# Patient Record
Sex: MALE | Race: WHITE | NOT HISPANIC OR LATINO
[De-identification: names, ages, dates, MRNs, and addresses within clinical notes are randomized per-mention and may not be internally consistent; named-entity substitution may affect disease eponyms.]

---

## 2019-11-20 ENCOUNTER — APPOINTMENT (OUTPATIENT)
Dept: ORTHOPEDIC SURGERY | Facility: CLINIC | Age: 54
End: 2019-11-20
Payer: COMMERCIAL

## 2019-11-20 VITALS
DIASTOLIC BLOOD PRESSURE: 85 MMHG | BODY MASS INDEX: 32.21 KG/M2 | WEIGHT: 225 LBS | HEART RATE: 62 BPM | SYSTOLIC BLOOD PRESSURE: 129 MMHG | HEIGHT: 70 IN

## 2019-11-20 PROCEDURE — 99204 OFFICE O/P NEW MOD 45 MIN: CPT | Mod: 25

## 2019-11-20 PROCEDURE — 20610 DRAIN/INJ JOINT/BURSA W/O US: CPT | Mod: LT

## 2019-11-20 PROCEDURE — 73030 X-RAY EXAM OF SHOULDER: CPT | Mod: LT

## 2019-11-20 NOTE — ASSESSMENT
[FreeTextEntry1] : Patient with left shoulder pain consistent with biceps tendinitis and rotator cuff tendinitis. Patient this condition was discussed with the patient and he verbalized understanding. Recommend a cortisone injection to the left shoulder the patient tolerated well. I will send him a prescription for meloxicam for an anti-inflammatory and recommended a course of physical therapy. Should his symptoms not improve he will return to the office in 6-8 weeks for repeat evaluation.

## 2019-11-20 NOTE — PROCEDURE
[FreeTextEntry1] : Injection: Shoulder Left\par \par There was a discussion with the patient regarding this procedure and all questions/concerns were answered.  All of the risks, benefits and alternatives were discussed at length.  These include but are not limited to bleeding, infection, and allergic reaction.  Alcohol was used to clean, sterilize and prep the skin at the injection site on the posterolateral aspect of the shoulder.  Ethyl chloride spray was used as a topical anesthetic.  A 22G needle was used to inject 4cc of 1% lidocaine and 1cc of 40mg/mL methylprednisolone into the shoulder.  A sterile bandage was applied to the site of the injection.  The patient tolerated the procedure well and there were no complications.\par \par

## 2019-11-20 NOTE — PHYSICAL EXAM
[Normal Touch] : sensation intact for  touch [Normal] : no peripheral adenopathy appreciated [de-identified] : Left Shoulder Exam\par \par Inspection: No malalignment, No defects\par Skin: No masses, No lesions\par Neck: Negative Spurlings, full ROM without pain\par ROM: Full range of motion of the left shoulder with forward flexion, abduction, internal and external rotation\par Painful arc ROM: Overhead\par Tenderness: No bicipital tenderness, no tenderness to the greater tuberosity/RTC insertion, no anterior shoulder/lesser tuberosity tenderness\par Strength: 5/5 ER, 5/5 IR in adduction, 5/5 supraspinatus testing\par AC Joint: No ttp, no pain with cross arm testing\par Biceps: Speed positive\par Impingement test: Negative [place]\par Stability: Negative apprehension, negative anterior/posterior load and shift\par Vasc: 2+ radial pulse\par Neuro: AIN, PIN, Ulnar nerve in tact to motor\par Sensation: In tact to light touch throughout\par \par  [de-identified] : LURDESR [de-identified] : 3 x-ray views of the left shoulder were obtained. There are no fractures or dislocations noted.

## 2019-11-20 NOTE — HISTORY OF PRESENT ILLNESS
[FreeTextEntry1] : 11/20/2019: The patient is a 53-year-old right-hand-dominant male who presents to the office with sharp left shoulder pain. The pain isn't present since September and he states that it began after a fishing trip where he overused the left arm while reeling. The pain is located deep in the shoulder and sometimes radiates down the upper part of his humerus on the lateral side. He also has anterior shoulder pain that is sharp in nature. He has not tried any over-the-counter medications, heat, or ice. He denies any upper extremity paresthesias.

## 2020-08-20 ENCOUNTER — LABORATORY RESULT (OUTPATIENT)
Age: 55
End: 2020-08-20

## 2020-08-21 ENCOUNTER — APPOINTMENT (OUTPATIENT)
Dept: FAMILY MEDICINE | Facility: CLINIC | Age: 55
End: 2020-08-21
Payer: COMMERCIAL

## 2020-08-21 VITALS
WEIGHT: 226 LBS | TEMPERATURE: 98 F | RESPIRATION RATE: 16 BRPM | OXYGEN SATURATION: 97 % | SYSTOLIC BLOOD PRESSURE: 160 MMHG | DIASTOLIC BLOOD PRESSURE: 90 MMHG | HEART RATE: 66 BPM | BODY MASS INDEX: 32.35 KG/M2 | HEIGHT: 70 IN

## 2020-08-21 DIAGNOSIS — Z02.82 ENCOUNTER FOR ADOPTION SERVICES: ICD-10-CM

## 2020-08-21 DIAGNOSIS — M75.82 OTHER SHOULDER LESIONS, LEFT SHOULDER: ICD-10-CM

## 2020-08-21 DIAGNOSIS — M75.22 BICIPITAL TENDINITIS, LEFT SHOULDER: ICD-10-CM

## 2020-08-21 PROCEDURE — 36415 COLL VENOUS BLD VENIPUNCTURE: CPT

## 2020-08-21 PROCEDURE — 99214 OFFICE O/P EST MOD 30 MIN: CPT | Mod: 25

## 2020-08-21 PROCEDURE — 99386 PREV VISIT NEW AGE 40-64: CPT | Mod: 25

## 2020-08-21 RX ORDER — MELOXICAM 15 MG/1
15 TABLET ORAL
Qty: 30 | Refills: 2 | Status: DISCONTINUED | COMMUNITY
Start: 2019-11-20 | End: 2020-08-21

## 2020-08-22 PROBLEM — M75.82 TENDINITIS OF LEFT ROTATOR CUFF: Status: RESOLVED | Noted: 2019-11-20 | Resolved: 2020-08-22

## 2020-08-22 PROBLEM — M75.22 BICEPS TENDINITIS OF LEFT UPPER EXTREMITY: Status: RESOLVED | Noted: 2019-11-20 | Resolved: 2020-08-22

## 2020-08-22 RX ORDER — ASPIRIN 81 MG
81 TABLET, DELAYED RELEASE (ENTERIC COATED) ORAL
Refills: 0 | Status: ACTIVE | COMMUNITY

## 2020-08-22 NOTE — PHYSICAL EXAM
[Well-Appearing] : well-appearing [Normal Sclera/Conjunctiva] : normal sclera/conjunctiva [No JVD] : no jugular venous distention [Regular Rhythm] : with a regular rhythm [No Respiratory Distress] : no respiratory distress  [Clear to Auscultation] : lungs were clear to auscultation bilaterally [Normal S1, S2] : normal S1 and S2 [No Joint Swelling] : no joint swelling [No Carotid Bruits] : no carotid bruits [Grossly Normal Strength/Tone] : grossly normal strength/tone [No Rash] : no rash [No Focal Deficits] : no focal deficits [Normal Gait] : normal gait [Speech Grossly Normal] : speech grossly normal [Alert and Oriented x3] : oriented to person, place, and time [de-identified] : ADITI [de-identified] : a bit anxious  engaging  [de-identified] : tanned

## 2020-08-22 NOTE — ASSESSMENT
[FreeTextEntry1] : New Patient exam for 54  year old WM with PMH as stated in HPI / active list. \par \par Noted to be hypertensive likely due to a combination of recent weight gain , alcohol use and lack of exercise and diet.\par Advised RASHEL and decrease in alcohol an begin daily walk.\par ASLO prescribed  valsartan/hctz  and advised FU in one week.\par \par Management : \par Will need cardiology and referral provided.\par Will need colonoscopy. \par \par See HPI and Plan\par \par Labs in office today.   Will advise. \par \par Best wishes offered !\par

## 2020-08-22 NOTE — REVIEW OF SYSTEMS
[Anxiety] : anxiety [Negative] : Eyes [Fever] : no fever [Chills] : no chills [Night Sweats] : no night sweats [Chest Pain] : no chest pain [Palpitations] : no palpitations [Lower Ext Edema] : no lower extremity edema [Abdominal Pain] : no abdominal pain [Shortness Of Breath] : no shortness of breath [Melena] : no melena [Heartburn] : no heartburn [Dizziness] : no dizziness [Headache] : no headache

## 2020-08-22 NOTE — HISTORY OF PRESENT ILLNESS
[FreeTextEntry1] : refill of foam SEND TO [facility] [place] \par and aptiom SEND TO MAIL AWAY\par  [de-identified] : Mr. ANTONIO LOUIS presents today to establish care being referred to me by his research search on internet. \par He is an affable 54 year old male with PMH significant for Seizure DO which he states occur only while sleeping and he is somewhat awake when occurring.  Multiple consult over the years (started at age 19) and multiple sleep studies.  Ultimately managed by Dr. JENNA Melendez at [facility].  \par Endorses his disappointment with weight gain, ETOH and little exercise during COVID.\par "Need to get back on track"\par \par PSH significant for  cardiac STENT  5 years ago\par \par Denies any recent ER visits/hospitalizations/ MVA's or MSK injuries. \par \par Providers:  Neurology  Dr Melendez\par                   Cardiology \par \par  HM:  Colonoscopy   NEVER\par \par Social:   ;  Ellie;  no children  \par             Works as  \par             Lived in [place] in recent decade  however NOW LIVES [place]/ [place] \par             Enjoys Boating

## 2020-08-27 ENCOUNTER — APPOINTMENT (OUTPATIENT)
Dept: CARDIOLOGY | Facility: CLINIC | Age: 55
End: 2020-08-27
Payer: COMMERCIAL

## 2020-08-27 ENCOUNTER — NON-APPOINTMENT (OUTPATIENT)
Age: 55
End: 2020-08-27

## 2020-08-27 VITALS
DIASTOLIC BLOOD PRESSURE: 80 MMHG | WEIGHT: 222 LBS | SYSTOLIC BLOOD PRESSURE: 112 MMHG | OXYGEN SATURATION: 96 % | BODY MASS INDEX: 31.85 KG/M2 | TEMPERATURE: 97.1 F | HEART RATE: 61 BPM

## 2020-08-27 PROCEDURE — 99205 OFFICE O/P NEW HI 60 MIN: CPT

## 2020-08-27 PROCEDURE — 93000 ELECTROCARDIOGRAM COMPLETE: CPT

## 2020-08-27 NOTE — ASSESSMENT
[FreeTextEntry1] : CAD -status post stent placement , now he has return of same symptoms; I recommended echocardiography for LV size, wall motion, LVEF; exercise myocardial perfusion scan to suggest ischemia.  Red flag symptom has been discussed with him.  Continue current medications\par \par Dyslipidemia -low cholesterol diet, continue medications, add Vascepa to his regimen; lipid panel in 6 weeks\par \par Hypertension -low-salt diet, continue medications\par \par Seizure disorder -continue medication follow-up with neurologist.\par \par Aggressive risk factor modification has been discussed with him at great length include regular walking, compliance to medication, low-cholesterol diet.  He will be reevaluated me after cardiac testing.

## 2020-08-27 NOTE — PHYSICAL EXAM
[General Appearance - Well Developed] : well developed [General Appearance - Well Nourished] : well nourished [Normal Appearance] : normal appearance [Well Groomed] : well groomed [No Deformities] : no deformities [General Appearance - In No Acute Distress] : no acute distress [Eyelids - No Xanthelasma] : the eyelids demonstrated no xanthelasmas [Normal Oral Mucosa] : normal oral mucosa [Normal Conjunctiva] : the conjunctiva exhibited no abnormalities [No Oral Cyanosis] : no oral cyanosis [No Oral Pallor] : no oral pallor [No Jugular Venous Valdovinos A Waves] : no jugular venous valdovinos A waves [Normal Jugular Venous V Waves Present] : normal jugular venous V waves present [Normal Jugular Venous A Waves Present] : normal jugular venous A waves present [Heart Sounds] : normal S1 and S2 [Heart Rate And Rhythm] : heart rate and rhythm were normal [Murmurs] : no murmurs present [Respiration, Rhythm And Depth] : normal respiratory rhythm and effort [Exaggerated Use Of Accessory Muscles For Inspiration] : no accessory muscle use [Auscultation Breath Sounds / Voice Sounds] : lungs were clear to auscultation bilaterally [Abdomen Soft] : soft [Abdomen Tenderness] : non-tender [Abdomen Mass (___ Cm)] : no abdominal mass palpated [Abnormal Walk] : normal gait [Gait - Sufficient For Exercise Testing] : the gait was sufficient for exercise testing [Petechial Hemorrhages (___cm)] : no petechial hemorrhages [Nail Clubbing] : no clubbing of the fingernails [Cyanosis, Localized] : no localized cyanosis [Skin Color & Pigmentation] : normal skin color and pigmentation [No Venous Stasis] : no venous stasis [] : no rash [Skin Lesions] : no skin lesions [No Xanthoma] : no  xanthoma was observed [Oriented To Time, Place, And Person] : oriented to person, place, and time [No Skin Ulcers] : no skin ulcer [Affect] : the affect was normal [Mood] : the mood was normal [No Anxiety] : not feeling anxious

## 2020-09-01 ENCOUNTER — APPOINTMENT (OUTPATIENT)
Dept: FAMILY MEDICINE | Facility: CLINIC | Age: 55
End: 2020-09-01
Payer: COMMERCIAL

## 2020-09-01 VITALS
WEIGHT: 225 LBS | HEIGHT: 70 IN | OXYGEN SATURATION: 98 % | SYSTOLIC BLOOD PRESSURE: 130 MMHG | TEMPERATURE: 98.1 F | RESPIRATION RATE: 16 BRPM | BODY MASS INDEX: 32.21 KG/M2 | DIASTOLIC BLOOD PRESSURE: 80 MMHG | HEART RATE: 68 BPM

## 2020-09-01 VITALS — DIASTOLIC BLOOD PRESSURE: 78 MMHG | SYSTOLIC BLOOD PRESSURE: 122 MMHG

## 2020-09-01 PROCEDURE — 99214 OFFICE O/P EST MOD 30 MIN: CPT

## 2020-09-01 NOTE — PHYSICAL EXAM
[No Acute Distress] : no acute distress [Normal Sclera/Conjunctiva] : normal sclera/conjunctiva [No JVD] : no jugular venous distention [No Respiratory Distress] : no respiratory distress  [No Accessory Muscle Use] : no accessory muscle use [Regular Rhythm] : with a regular rhythm [Normal S1, S2] : normal S1 and S2 [Normal] : normal gait, coordination grossly intact, no focal deficits and deep tendon reflexes were 2+ and symmetric [Alert and Oriented x3] : oriented to person, place, and time [Normal Insight/Judgement] : insight and judgment were intact [de-identified] : a bit anxious  engaging

## 2020-09-01 NOTE — HISTORY OF PRESENT ILLNESS
[FreeTextEntry1] : would like script for colonoscopy \par discuss lab results \par [facility]  [de-identified] : Last seen to establish care Aug.21 and encounter reviewed.\par HTN and started on diltiazem.\par Seen in consultation with  with Cardiology and reviewed;  anticipating nuclear stress test and ECHO mid October.\par Vascepa  added  for elevated TG's \par has walked "40 miles" in the last week.\par \par Feels good.

## 2020-09-01 NOTE — REVIEW OF SYSTEMS
[Fatigue] : fatigue [Negative] : Psychiatric [Fever] : no fever [Night Sweats] : no night sweats [FreeTextEntry2] :                                                                                     Chronic due to Keppra

## 2020-09-01 NOTE — ASSESSMENT
[FreeTextEntry1] : HTN  now at goal  continue current management and continue walking and dietary discretion! \par \par FU i 6 weeks and will repeat FASTING LIPID PANEL

## 2020-09-14 ENCOUNTER — RX CHANGE (OUTPATIENT)
Age: 55
End: 2020-09-14

## 2020-09-15 RX ORDER — CLOBETASOL PROPIONATE 0.5 MG/G
0.05 AEROSOL, FOAM TOPICAL TWICE DAILY
Qty: 1 | Refills: 3 | Status: DISCONTINUED | COMMUNITY
Start: 1900-01-01 | End: 2020-09-15

## 2020-09-29 RX ORDER — LEVETIRACETAM 500 MG/1
500 TABLET, FILM COATED ORAL TWICE DAILY
Qty: 80 | Refills: 0 | Status: DISCONTINUED | COMMUNITY
Start: 1900-01-01 | End: 2020-09-29

## 2020-10-14 ENCOUNTER — LABORATORY RESULT (OUTPATIENT)
Age: 55
End: 2020-10-14

## 2020-10-14 ENCOUNTER — APPOINTMENT (OUTPATIENT)
Dept: FAMILY MEDICINE | Facility: CLINIC | Age: 55
End: 2020-10-14
Payer: COMMERCIAL

## 2020-10-14 VITALS — TEMPERATURE: 98.1 F

## 2020-10-14 PROCEDURE — 36415 COLL VENOUS BLD VENIPUNCTURE: CPT

## 2020-10-15 ENCOUNTER — APPOINTMENT (OUTPATIENT)
Dept: CARDIOLOGY | Facility: CLINIC | Age: 55
End: 2020-10-15
Payer: COMMERCIAL

## 2020-10-15 PROCEDURE — 93015 CV STRESS TEST SUPVJ I&R: CPT

## 2020-10-15 PROCEDURE — A9502: CPT

## 2020-10-15 PROCEDURE — 78452 HT MUSCLE IMAGE SPECT MULT: CPT

## 2020-10-15 PROCEDURE — 93306 TTE W/DOPPLER COMPLETE: CPT

## 2020-10-19 ENCOUNTER — RX RENEWAL (OUTPATIENT)
Age: 55
End: 2020-10-19

## 2020-10-20 ENCOUNTER — APPOINTMENT (OUTPATIENT)
Dept: CARDIOLOGY | Facility: CLINIC | Age: 55
End: 2020-10-20
Payer: COMMERCIAL

## 2020-10-20 VITALS
DIASTOLIC BLOOD PRESSURE: 82 MMHG | OXYGEN SATURATION: 97 % | BODY MASS INDEX: 32.14 KG/M2 | WEIGHT: 224 LBS | HEART RATE: 61 BPM | TEMPERATURE: 97.1 F | SYSTOLIC BLOOD PRESSURE: 128 MMHG

## 2020-10-20 DIAGNOSIS — Z12.11 ENCOUNTER FOR SCREENING FOR MALIGNANT NEOPLASM OF COLON: ICD-10-CM

## 2020-10-20 PROCEDURE — 99214 OFFICE O/P EST MOD 30 MIN: CPT

## 2020-10-20 RX ORDER — LEVETIRACETAM 500 MG/1
500 TABLET, FILM COATED, EXTENDED RELEASE ORAL
Qty: 180 | Refills: 1 | Status: DISCONTINUED | COMMUNITY
Start: 2020-09-17 | End: 2020-10-20

## 2020-10-20 RX ORDER — LEVETIRACETAM 750 MG/1
750 TABLET, EXTENDED RELEASE ORAL
Qty: 180 | Refills: 2 | Status: DISCONTINUED | COMMUNITY
Start: 2020-10-20 | End: 2020-10-20

## 2020-10-20 RX ORDER — LEVETIRACETAM 750 MG/1
750 TABLET, FILM COATED ORAL TWICE DAILY
Qty: 180 | Refills: 1 | Status: DISCONTINUED | COMMUNITY
Start: 2020-10-19 | End: 2020-10-20

## 2020-10-21 RX ORDER — LEVETIRACETAM 500 MG/1
500 TABLET, FILM COATED, EXTENDED RELEASE ORAL
Qty: 180 | Refills: 1 | Status: DISCONTINUED | COMMUNITY
Start: 2020-10-20 | End: 2020-10-21

## 2020-12-27 ENCOUNTER — APPOINTMENT (OUTPATIENT)
Dept: DISASTER EMERGENCY | Facility: CLINIC | Age: 55
End: 2020-12-27

## 2020-12-28 LAB — SARS-COV-2 N GENE NPH QL NAA+PROBE: NOT DETECTED

## 2020-12-30 ENCOUNTER — OUTPATIENT (OUTPATIENT)
Dept: OUTPATIENT SERVICES | Facility: HOSPITAL | Age: 55
LOS: 1 days | End: 2020-12-30

## 2021-04-07 ENCOUNTER — NON-APPOINTMENT (OUTPATIENT)
Age: 56
End: 2021-04-07

## 2021-04-12 ENCOUNTER — LABORATORY RESULT (OUTPATIENT)
Age: 56
End: 2021-04-12

## 2021-04-13 ENCOUNTER — APPOINTMENT (OUTPATIENT)
Dept: FAMILY MEDICINE | Facility: CLINIC | Age: 56
End: 2021-04-13
Payer: COMMERCIAL

## 2021-04-13 VITALS
WEIGHT: 209 LBS | OXYGEN SATURATION: 98 % | HEART RATE: 67 BPM | SYSTOLIC BLOOD PRESSURE: 120 MMHG | BODY MASS INDEX: 29.92 KG/M2 | TEMPERATURE: 98 F | DIASTOLIC BLOOD PRESSURE: 78 MMHG | RESPIRATION RATE: 16 BRPM | HEIGHT: 70 IN

## 2021-04-13 PROCEDURE — 99214 OFFICE O/P EST MOD 30 MIN: CPT | Mod: 25

## 2021-04-13 PROCEDURE — 36415 COLL VENOUS BLD VENIPUNCTURE: CPT

## 2021-04-13 PROCEDURE — 99072 ADDL SUPL MATRL&STAF TM PHE: CPT

## 2021-04-13 PROCEDURE — 96372 THER/PROPH/DIAG INJ SC/IM: CPT

## 2021-04-13 RX ORDER — TAMSULOSIN HYDROCHLORIDE 0.4 MG/1
0.4 CAPSULE ORAL
Qty: 90 | Refills: 1 | Status: DISCONTINUED | COMMUNITY
Start: 2020-10-20 | End: 2021-04-13

## 2021-04-13 RX ADMIN — METHYLPREDNISOLONE SODIUM SUCCINATE MG: 40 INJECTION, POWDER, FOR SOLUTION INTRAMUSCULAR; INTRAVENOUS at 00:00

## 2021-04-14 RX ORDER — METHYLPREDNISOLONE 40 MG/ML
40 INJECTION, POWDER, LYOPHILIZED, FOR SOLUTION INTRAMUSCULAR; INTRAVENOUS
Qty: 1 | Refills: 0 | Status: COMPLETED | OUTPATIENT
Start: 2021-04-13

## 2021-04-14 NOTE — HISTORY OF PRESENT ILLNESS
[FreeTextEntry1] : 6M follow up \par needs refills on valsartan-hctz, levetiracetam and aptiom\par miri sheehan  [de-identified] : Last seen in office on Sept. 2020 encounter reviewed.\par Patient presents to the office today for 6 month follow up - medication refill. Patient is being seen for HTN and seizure disorder FU. Patient was prediabetic last time.  Patient has received the Pfizer COVID-19 vaccination. Patient has actively lost about 15 lbs. Patient walks 20-25 miles a week and has modified his diet. Patient is complaining of back spasms for the past two weeks. Last week the pain had worsened, and shot down the arm. The patient was not able to move his arm secondary to pain. Nelson has been taking ibuprofen and Tylenol to relieve the pain. Patient confirms laying down helps with the shooting pain. Endorses he was gardening and also lifting heavy boxes in recent weeks.\par DID have massage which helped "a bit" \par Patient has went to ortho regarding this in the past and was given a steroid shot. 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 Patient went to [facility] for colonoscopy - no abnormal findings - Jan 2021. Denies any recent ER visits/hospitalizations/ MVA's or MSK injuries. \par \par In review Sept 2020:\par HTN and started on diltiazem.\par Seen in consultation with  with Cardiology and reviewed;  anticipating nuclear stress test and ECHO mid October.\par Vascepa  added  for elevated TG's has walked "40 miles" in the last week.

## 2021-04-14 NOTE — ADDENDUM
[FreeTextEntry1] : I, Lubna Hillman acting as a scribe for Dr. Claudette Ahn MD on 04/13/2021 at 3:16 PM. \par I, May Love acting as a scribe for Dr. Claudette Ahn MD on 04/13/2021 at 3:23 PM.

## 2021-04-14 NOTE — END OF VISIT
[FreeTextEntry3] : Medical record entries made by the scribe today, were at my direction and personally dictated to them by me, Dr. Claudette Ahn on 04/13/2021. I have reviewed the chart and agree that the record accurately reflects my personal performance of the history, physical exam, assessment and plan.

## 2021-04-14 NOTE — PLAN
[FreeTextEntry1] : FUV  for 55 year old M with PMH as stated in HPI / active list. \par \par Management : \par \par Medication refill provided and no change in dosage or frequency. \par \par See HPI and Plan\par \par Labs in office today.\par \par Will prescribe muscle relaxer for the patient as needed. Advised patient to only take medication at bedtime, and to avoid operating machinery after taking medication.  \par Steroid injection given in office today.  \par \par Best wishes offered !

## 2021-04-14 NOTE — PHYSICAL EXAM
[de-identified] : calm and engaging  [de-identified] : tenderness left shoulder blade region with palpable spasming

## 2021-04-27 ENCOUNTER — APPOINTMENT (OUTPATIENT)
Dept: CARDIOLOGY | Facility: CLINIC | Age: 56
End: 2021-04-27
Payer: COMMERCIAL

## 2021-04-27 VITALS
HEART RATE: 61 BPM | BODY MASS INDEX: 29.7 KG/M2 | WEIGHT: 207 LBS | SYSTOLIC BLOOD PRESSURE: 130 MMHG | DIASTOLIC BLOOD PRESSURE: 88 MMHG | TEMPERATURE: 98 F | OXYGEN SATURATION: 99 %

## 2021-04-27 DIAGNOSIS — M62.838 OTHER MUSCLE SPASM: ICD-10-CM

## 2021-04-27 DIAGNOSIS — Z01.818 ENCOUNTER FOR OTHER PREPROCEDURAL EXAMINATION: ICD-10-CM

## 2021-04-27 DIAGNOSIS — Z87.39 PERSONAL HISTORY OF OTHER DISEASES OF THE MUSCULOSKELETAL SYSTEM AND CONNECTIVE TISSUE: ICD-10-CM

## 2021-04-27 PROCEDURE — 99214 OFFICE O/P EST MOD 30 MIN: CPT

## 2021-04-27 PROCEDURE — 99072 ADDL SUPL MATRL&STAF TM PHE: CPT

## 2021-05-04 ENCOUNTER — MED ADMIN CHARGE (OUTPATIENT)
Age: 56
End: 2021-05-04

## 2021-05-04 ENCOUNTER — APPOINTMENT (OUTPATIENT)
Dept: FAMILY MEDICINE | Facility: CLINIC | Age: 56
End: 2021-05-04
Payer: COMMERCIAL

## 2021-05-04 VITALS
OXYGEN SATURATION: 98 % | SYSTOLIC BLOOD PRESSURE: 150 MMHG | WEIGHT: 208 LBS | BODY MASS INDEX: 29.78 KG/M2 | HEIGHT: 70 IN | HEART RATE: 71 BPM | TEMPERATURE: 97.2 F | DIASTOLIC BLOOD PRESSURE: 90 MMHG | RESPIRATION RATE: 16 BRPM

## 2021-05-04 PROCEDURE — 20552 NJX 1/MLT TRIGGER POINT 1/2: CPT

## 2021-05-04 PROCEDURE — 99214 OFFICE O/P EST MOD 30 MIN: CPT | Mod: 25

## 2021-05-04 PROCEDURE — 99072 ADDL SUPL MATRL&STAF TM PHE: CPT

## 2021-05-04 RX ADMIN — TRIAMCINOLONE ACETONIDE 0 MG/ML: 40 INJECTION, SUSPENSION INTRA-ARTICULAR; INTRAMUSCULAR at 00:00

## 2021-05-04 NOTE — REVIEW OF SYSTEMS
[Joint Pain] : joint pain [Muscle Pain] : muscle pain [Negative] : Gastrointestinal [FreeTextEntry9] : (+) left shoulder pain

## 2021-05-04 NOTE — PHYSICAL EXAM
[No Acute Distress] : no acute distress [Normal Sclera/Conjunctiva] : normal sclera/conjunctiva [No Respiratory Distress] : no respiratory distress  [No Accessory Muscle Use] : no accessory muscle use [Regular Rhythm] : with a regular rhythm [Normal S1, S2] : normal S1 and S2 [de-identified] : engaging   [de-identified] : OMM  MASK [de-identified] : point tenderness upper mid back  no spasming appreciated.

## 2021-05-04 NOTE — END OF VISIT
[FreeTextEntry3] : Medical record entries made by the scribe today, were at my direction and personally dictated to them by me, Dr. Claudette Ahn on 05/04/2021. I have reviewed the chart and agree that the record accurately reflects my personal performance of the history, physical exam, assessment and plan.

## 2021-05-04 NOTE — ADDENDUM
[FreeTextEntry1] : I, Lubna Hillman acting as a scribe for Dr. Claudette Ahn MD on 05/04/2021 at 11:24 AM.

## 2021-05-04 NOTE — PLAN
[FreeTextEntry1] : Acute visit for 55 year old M with PMH as stated in HPI / active list. \par \par Management : \par \par See HPI and Plan\par \par Administered a Lidocaine / steroid  injection to help alleviate the left  trapezius. \par antisepsis with alcohol/betadine to skin\par under aseptic technique 1cc 1% lido + 1cc kenalog 40mg injected into left trapezius medial scapular boarder\par Procedure well tolerated w/o complication, DSD applied\par Performed by ROWENA SLATER\par \par ADVISED to rest UE and consider a sling for a few days.  \par \par \par Best wishes offered !

## 2021-05-04 NOTE — HISTORY OF PRESENT ILLNESS
[FreeTextEntry8] : 55 year old M presents today c/o of left shoulder pain/ trapezius PAIN  onset 6 weeks ago. \par Patient states that he feels "tingling sensations".\par Patient has taken Prednisone and muscle relaxers which did provide some relief however temporary.\par When queried admits to returning to activities in yard. \par Patient also reports he has used a heating pad which slightly alleviated the pain. \par \par NOW endorses hx of abhishek e 6 years ago and received TRIGGER point injection with relief and resolution\par \par Last seen in office 04/13/2021 for FUV- encounter reviewed. \par [place] [facility] \par [facility]\par

## 2021-05-05 RX ORDER — TRIAMCINOLONE ACETONIDE 40 MG/ML
40 SUSPENSION INTRA-ARTERIAL; INTRAMUSCULAR
Qty: 1 | Refills: 0 | Status: COMPLETED | OUTPATIENT
Start: 2021-05-04

## 2021-07-28 ENCOUNTER — NON-APPOINTMENT (OUTPATIENT)
Age: 56
End: 2021-07-28

## 2021-11-16 ENCOUNTER — APPOINTMENT (OUTPATIENT)
Dept: FAMILY MEDICINE | Facility: CLINIC | Age: 56
End: 2021-11-16
Payer: COMMERCIAL

## 2021-11-16 ENCOUNTER — NON-APPOINTMENT (OUTPATIENT)
Age: 56
End: 2021-11-16

## 2021-11-16 VITALS
TEMPERATURE: 97.9 F | WEIGHT: 205 LBS | HEIGHT: 70 IN | HEART RATE: 64 BPM | RESPIRATION RATE: 16 BRPM | BODY MASS INDEX: 29.35 KG/M2 | OXYGEN SATURATION: 97 % | DIASTOLIC BLOOD PRESSURE: 82 MMHG | SYSTOLIC BLOOD PRESSURE: 130 MMHG

## 2021-11-16 DIAGNOSIS — Z00.00 ENCOUNTER FOR GENERAL ADULT MEDICAL EXAMINATION W/OUT ABNORMAL FINDINGS: ICD-10-CM

## 2021-11-16 DIAGNOSIS — M25.512 PAIN IN LEFT SHOULDER: ICD-10-CM

## 2021-11-16 DIAGNOSIS — S46.819A STRAIN OF OTHER MUSCLES, FASCIA AND TENDONS AT SHOULDER AND UPPER ARM LEVEL, UNSPECIFIED ARM, INITIAL ENCOUNTER: ICD-10-CM

## 2021-11-16 PROCEDURE — 36415 COLL VENOUS BLD VENIPUNCTURE: CPT

## 2021-11-16 PROCEDURE — 99214 OFFICE O/P EST MOD 30 MIN: CPT | Mod: 25

## 2021-11-16 PROCEDURE — 99396 PREV VISIT EST AGE 40-64: CPT | Mod: 25

## 2021-11-16 RX ORDER — ICOSAPENT ETHYL 1 G/1
1 CAPSULE ORAL
Qty: 360 | Refills: 1 | Status: DISCONTINUED | COMMUNITY
End: 2021-11-16

## 2021-11-17 PROBLEM — S46.819A TRAPEZIUS STRAIN: Status: RESOLVED | Noted: 2021-05-04 | Resolved: 2021-11-17

## 2021-11-17 PROBLEM — M25.512 ACUTE PAIN OF LEFT SHOULDER: Status: RESOLVED | Noted: 2021-05-04 | Resolved: 2021-11-17

## 2021-11-17 NOTE — HISTORY OF PRESENT ILLNESS
[FreeTextEntry1] : CPE \par Last seen for CPE when establishing care AUGUST 2020\par Since has followed with cardiology for ASHD, s/p stent 2017 as well as  HTN/HLD  ; echo and nuclear stress test Normal. \par With  regard to seizure DO he has not seen Neurology since MArch 2020; endorses he continues to have some nocturnal seiazures intermittently evident to him as witnesses by wife and he note fatigue next day. \par Will again request Consult note form Dr. Lester .\par Acute visit here for trapezium spasming and cervical pain; resolved after trigger injections and res.\par \par Activity: enjoys heavy duty yard work and walks 5-7 miles several days per week.\par \par Again is working part-time  for [employer] in  work.  Enjoys it.  [de-identified] : In review: August 2020\par Mr. ANTONIO LOUIS presents today to establish care being referred to me by his research search on internet. \par He is an affable 54 year old male with PMH significant for Seizure DO which he states occur only while sleeping and he is somewhat awake when occurring.  Multiple consult over the years (started at age 19) and multiple sleep studies.  Ultimately managed by Dr. JENNA Melendez at [facility].  \par Endorses his disappointment with weight gain, ETOH and little exercise during COVID.\par "Need to get back on track"\par \par PSH significant for  cardiac STENT  5 years ago\par \par Denies any recent ER visits/hospitalizations/ MVA's or MSK injuries. \par \par Providers:  Neurology  Dr Melendez\par                   Cardiology \par \par  HM:  Colonoscopy   NEVER\par \par Social:   ;  Ellie;  no children  \par             Works as  \par             Lived in [place] in recent decade  however NOW LIVES [place]/ [place] \par             Enjoys Boating

## 2021-11-17 NOTE — PHYSICAL EXAM
[Well-Appearing] : well-appearing [Normal Sclera/Conjunctiva] : normal sclera/conjunctiva [PERRL] : pupils equal round and reactive to light [Normal TMs] : both tympanic membranes were normal [No JVD] : no jugular venous distention [No Lymphadenopathy] : no lymphadenopathy [Supple] : supple [No Respiratory Distress] : no respiratory distress  [Clear to Auscultation] : lungs were clear to auscultation bilaterally [Regular Rhythm] : with a regular rhythm [Normal S1, S2] : normal S1 and S2 [No Carotid Bruits] : no carotid bruits [Soft] : abdomen soft [Non Tender] : non-tender [No Masses] : no abdominal mass palpated [Normal Supraclavicular Nodes] : no supraclavicular lymphadenopathy [Normal Posterior Cervical Nodes] : no posterior cervical lymphadenopathy [Normal Anterior Cervical Nodes] : no anterior cervical lymphadenopathy [No Spinal Tenderness] : no spinal tenderness [No Joint Swelling] : no joint swelling [Grossly Normal Strength/Tone] : grossly normal strength/tone [No Rash] : no rash [No Focal Deficits] : no focal deficits [Normal Gait] : normal gait [Speech Grossly Normal] : speech grossly normal [Alert and Oriented x3] : oriented to person, place, and time [de-identified] :   engaging  [de-identified] : ADITI [de-identified] : tanned  follows with [facility]

## 2021-11-17 NOTE — HEALTH RISK ASSESSMENT
[Very Good] : ~his/her~  mood as very good [Yes] : Yes [2 - 3 times a week (3 pts)] : 2 - 3  times a week (3 points) [No falls in past year] : Patient reported no falls in the past year [0] : 2) Feeling down, depressed, or hopeless: Not at all (0) [PHQ-2 Negative - No further assessment needed] : PHQ-2 Negative - No further assessment needed [] : No [de-identified] : DENIES  [de-identified] : cardiology  neurology  [de-identified] : walks  5-7 miles several days per week  [de-identified] : Jerzy  'sometimes too much  [[id number]] : 0 [Patient reported colonoscopy was normal] : Patient reported colonoscopy was normal [ColonoscopyDate] : 2020  [ColonoscopyComments] : FU 10 years

## 2021-11-17 NOTE — ASSESSMENT
[FreeTextEntry1] : Annual exam for 55  year old WM with PMH as stated in HPI / active list. \par \par Management : \par \par Refills provided. \par \par Again will request ON/Consult from Neurologist;  advise to concern establishing with neurology here as he endorses Dr. Lester told him he  didn't need to FU unless "a new issue". \par \par \par See HPI and Plan\par \par Labs in office today.   Will advise. \par \par Best wishes offered ! \par

## 2021-11-17 NOTE — REVIEW OF SYSTEMS
[Anxiety] : anxiety [Negative] : Integumentary [Fever] : no fever [Chills] : no chills [Night Sweats] : no night sweats [Chest Pain] : no chest pain [Palpitations] : no palpitations [Lower Ext Edema] : no lower extremity edema [Shortness Of Breath] : no shortness of breath [Abdominal Pain] : no abdominal pain [Heartburn] : no heartburn [Melena] : no melena [Headache] : no headache [Dizziness] : no dizziness

## 2021-11-17 NOTE — PHYSICAL EXAM
[Well-Appearing] : well-appearing [Normal Sclera/Conjunctiva] : normal sclera/conjunctiva [PERRL] : pupils equal round and reactive to light [Normal TMs] : both tympanic membranes were normal [No JVD] : no jugular venous distention [No Lymphadenopathy] : no lymphadenopathy [Supple] : supple [No Respiratory Distress] : no respiratory distress  [Clear to Auscultation] : lungs were clear to auscultation bilaterally [Regular Rhythm] : with a regular rhythm [Normal S1, S2] : normal S1 and S2 [No Carotid Bruits] : no carotid bruits [Soft] : abdomen soft [Non Tender] : non-tender [No Masses] : no abdominal mass palpated [Normal Supraclavicular Nodes] : no supraclavicular lymphadenopathy [Normal Posterior Cervical Nodes] : no posterior cervical lymphadenopathy [Normal Anterior Cervical Nodes] : no anterior cervical lymphadenopathy [No Spinal Tenderness] : no spinal tenderness [No Joint Swelling] : no joint swelling [Grossly Normal Strength/Tone] : grossly normal strength/tone [No Rash] : no rash [No Focal Deficits] : no focal deficits [Normal Gait] : normal gait [Speech Grossly Normal] : speech grossly normal [Alert and Oriented x3] : oriented to person, place, and time [de-identified] :   engaging  [de-identified] : ADITI [de-identified] : tanned  follows with [facility]

## 2021-11-17 NOTE — HEALTH RISK ASSESSMENT
[Very Good] : ~his/her~  mood as very good [Yes] : Yes [2 - 3 times a week (3 pts)] : 2 - 3  times a week (3 points) [No falls in past year] : Patient reported no falls in the past year [0] : 2) Feeling down, depressed, or hopeless: Not at all (0) [PHQ-2 Negative - No further assessment needed] : PHQ-2 Negative - No further assessment needed [] : No [de-identified] : DENIES  [de-identified] : cardiology  neurology  [de-identified] : walks  5-7 miles several days per week  [de-identified] : Jerzy  'sometimes too much  [[id number]] : 0 [Patient reported colonoscopy was normal] : Patient reported colonoscopy was normal [ColonoscopyDate] : 2020  [ColonoscopyComments] : FU 10 years

## 2021-11-17 NOTE — HISTORY OF PRESENT ILLNESS
[FreeTextEntry1] : CPE \par Last seen for CPE when establishing care AUGUST 2020\par Since has followed with cardiology for ASHD, s/p stent 2017 as well as  HTN/HLD  ; echo and nuclear stress test Normal. \par With  regard to seizure DO he has not seen Neurology since MArch 2020; endorses he continues to have some nocturnal seiazures intermittently evident to him as witnesses by wife and he note fatigue next day. \par Will again request Consult note form Dr. Lester .\par Acute visit here for trapezium spasming and cervical pain; resolved after trigger injections and res.\par \par Activity: enjoys heavy duty yard work and walks 5-7 miles several days per week.\par \par Again is working part-time  for [employer] in  work.  Enjoys it.  [de-identified] : In review: August 2020\par Mr. ANTONIO LOUIS presents today to establish care being referred to me by his research search on internet. \par He is an affable 54 year old male with PMH significant for Seizure DO which he states occur only while sleeping and he is somewhat awake when occurring.  Multiple consult over the years (started at age 19) and multiple sleep studies.  Ultimately managed by Dr. JENNA Melendez at [facility].  \par Endorses his disappointment with weight gain, ETOH and little exercise during COVID.\par "Need to get back on track"\par \par PSH significant for  cardiac STENT  5 years ago\par \par Denies any recent ER visits/hospitalizations/ MVA's or MSK injuries. \par \par Providers:  Neurology  Dr Melendez\par                   Cardiology \par \par  HM:  Colonoscopy   NEVER\par \par Social:   ;  Ellie;  no children  \par             Works as  \par             Lived in [place] in recent decade  however NOW LIVES [place]/ [place] \par             Enjoys Boating

## 2021-11-24 LAB
ALBUMIN SERPL ELPH-MCNC: 5.1 G/DL
ALP BLD-CCNC: 75 U/L
ALT SERPL-CCNC: 53 U/L
ANION GAP SERPL CALC-SCNC: 18 MMOL/L
AST SERPL-CCNC: 34 U/L
BASOPHILS # BLD AUTO: 0.05 K/UL
BASOPHILS NFR BLD AUTO: 1.1 %
BILIRUB SERPL-MCNC: 0.9 MG/DL
BUN SERPL-MCNC: 14 MG/DL
CALCIUM SERPL-MCNC: 10.3 MG/DL
CHLORIDE SERPL-SCNC: 97 MMOL/L
CHOLEST SERPL-MCNC: 245 MG/DL
CO2 SERPL-SCNC: 23 MMOL/L
CREAT SERPL-MCNC: 0.99 MG/DL
EOSINOPHIL # BLD AUTO: 0.08 K/UL
EOSINOPHIL NFR BLD AUTO: 1.7 %
ESTIMATED AVERAGE GLUCOSE: 123 MG/DL
FOLATE SERPL-MCNC: 13.6 NG/ML
GLUCOSE SERPL-MCNC: 129 MG/DL
HBA1C MFR BLD HPLC: 5.9 %
HCT VFR BLD CALC: 48.2 %
HDLC SERPL-MCNC: 78 MG/DL
HGB BLD-MCNC: 16.3 G/DL
IMM GRANULOCYTES NFR BLD AUTO: 0 %
LDLC SERPL CALC-MCNC: 97 MG/DL
LYMPHOCYTES # BLD AUTO: 1.21 K/UL
LYMPHOCYTES NFR BLD AUTO: 26.2 %
MAN DIFF?: NORMAL
MCHC RBC-ENTMCNC: 31 PG
MCHC RBC-ENTMCNC: 33.8 GM/DL
MCV RBC AUTO: 91.6 FL
MONOCYTES # BLD AUTO: 0.42 K/UL
MONOCYTES NFR BLD AUTO: 9.1 %
NEUTROPHILS # BLD AUTO: 2.85 K/UL
NEUTROPHILS NFR BLD AUTO: 61.9 %
NONHDLC SERPL-MCNC: 167 MG/DL
PLATELET # BLD AUTO: 185 K/UL
POTASSIUM SERPL-SCNC: 5.1 MMOL/L
PROT SERPL-MCNC: 7.9 G/DL
PSA FREE FLD-MCNC: 27 %
PSA FREE SERPL-MCNC: 0.14 NG/ML
PSA SERPL-MCNC: 0.52 NG/ML
RBC # BLD: 5.26 M/UL
RBC # FLD: 12 %
SODIUM SERPL-SCNC: 138 MMOL/L
TRIGL SERPL-MCNC: 348 MG/DL
TSH SERPL-ACNC: 1.62 UIU/ML
VIT B12 SERPL-MCNC: 421 PG/ML
WBC # FLD AUTO: 4.61 K/UL

## 2021-12-10 ENCOUNTER — APPOINTMENT (OUTPATIENT)
Dept: CARDIOLOGY | Facility: CLINIC | Age: 56
End: 2021-12-10

## 2021-12-10 ENCOUNTER — EMERGENCY (EMERGENCY)
Facility: HOSPITAL | Age: 56
LOS: 1 days | End: 2021-12-10
Admitting: EMERGENCY MEDICINE
Payer: COMMERCIAL

## 2021-12-10 ENCOUNTER — NON-APPOINTMENT (OUTPATIENT)
Age: 56
End: 2021-12-10

## 2021-12-10 PROCEDURE — 70450 CT HEAD/BRAIN W/O DYE: CPT | Mod: 26

## 2021-12-10 PROCEDURE — 99285 EMERGENCY DEPT VISIT HI MDM: CPT

## 2021-12-13 ENCOUNTER — APPOINTMENT (OUTPATIENT)
Dept: CARDIOLOGY | Facility: CLINIC | Age: 56
End: 2021-12-13
Payer: COMMERCIAL

## 2021-12-13 VITALS
TEMPERATURE: 97.3 F | WEIGHT: 210 LBS | DIASTOLIC BLOOD PRESSURE: 80 MMHG | OXYGEN SATURATION: 98 % | SYSTOLIC BLOOD PRESSURE: 150 MMHG | HEART RATE: 67 BPM | HEIGHT: 70 IN | BODY MASS INDEX: 30.06 KG/M2

## 2021-12-13 PROCEDURE — 99215 OFFICE O/P EST HI 40 MIN: CPT | Mod: 25

## 2021-12-13 PROCEDURE — 93000 ELECTROCARDIOGRAM COMPLETE: CPT

## 2021-12-14 ENCOUNTER — APPOINTMENT (OUTPATIENT)
Dept: FAMILY MEDICINE | Facility: CLINIC | Age: 56
End: 2021-12-14
Payer: COMMERCIAL

## 2021-12-14 VITALS
OXYGEN SATURATION: 98 % | TEMPERATURE: 98 F | DIASTOLIC BLOOD PRESSURE: 80 MMHG | HEIGHT: 70 IN | SYSTOLIC BLOOD PRESSURE: 138 MMHG | HEART RATE: 69 BPM | RESPIRATION RATE: 16 BRPM | WEIGHT: 210 LBS | BODY MASS INDEX: 30.06 KG/M2

## 2021-12-14 PROCEDURE — 99496 TRANSJ CARE MGMT HIGH F2F 7D: CPT

## 2021-12-14 RX ORDER — METOPROLOL TARTRATE 25 MG/1
25 TABLET, FILM COATED ORAL
Qty: 60 | Refills: 0 | Status: DISCONTINUED | COMMUNITY
Start: 1900-01-01 | End: 2021-12-14

## 2021-12-15 ENCOUNTER — APPOINTMENT (OUTPATIENT)
Dept: NEUROLOGY | Facility: CLINIC | Age: 56
End: 2021-12-15

## 2021-12-24 NOTE — HISTORY OF PRESENT ILLNESS
[Post-hospitalization from ___ Hospital] : Post-hospitalization from [unfilled] Hospital [Admitted on: ___] : The patient was admitted on [unfilled] [FreeTextEntry2] : As per patient: \par Was out of Valsartan for about 5-7 days: "Pharmacy screwed up". \par \par Was experiencing severe  headaches which were always the same; behind both ears and radiating posteriorly surrounding scalp for about  10 days. \par Was traveling to [place] for work  and "just feeling awful"  \par Patient also seen at [facility] ER in [place] on 12/8/21.\par \par Treated with IV labetalol and Tylenol for "several rounds" and BP normalized and he was discharged on Valsartan ( at usual dose ) . NO IMAGING or LABS done. \par \par Returned on December 10 and was sent to  [facility] by RN at cardiologist office as BP on admission to office was 230/120\par \par ER discharge summary for [facility] reviewed : \par Treated with Labetalol x 2 and oxycodone for H/A  and also CT of Head.   Unremarkable. \par \par Cardiology encounter yesterday and reviewed and noted for STOP METOPROLOL; Labetalol 200 mg BID \par Cyclobenzaprine for "neck " which he opined is causing H/A. \par \par ALso advised Neurology Consult and MRI EEG as per MD in [place];  \par He has appt. with Dr. SWAIN in [place] in early January.\par \par Neurology ON from Dr. Lester, [facility], of March 18, 2020  for daily nightly seizures usually during sleep for many years  reviewed (after my request for ON).  \par

## 2021-12-24 NOTE — PHYSICAL EXAM
[No Acute Distress] : no acute distress [Normal Sclera/Conjunctiva] : normal sclera/conjunctiva [PERRL] : pupils equal round and reactive to light [No JVD] : no jugular venous distention [No Respiratory Distress] : no respiratory distress  [No Accessory Muscle Use] : no accessory muscle use [Clear to Auscultation] : lungs were clear to auscultation bilaterally [Regular Rhythm] : with a regular rhythm [Normal S1, S2] : normal S1 and S2 [No Joint Swelling] : no joint swelling [Grossly Normal Strength/Tone] : grossly normal strength/tone [Coordination Grossly Intact] : coordination grossly intact [No Focal Deficits] : no focal deficits [Deep Tendon Reflexes (DTR)] : deep tendon reflexes were 2+ and symmetric [Alert and Oriented x3] : oriented to person, place, and time [Normal Insight/Judgement] : insight and judgment were intact [de-identified] : a bit weary appearing  engaging  [de-identified] : ADITI

## 2021-12-24 NOTE — REVIEW OF SYSTEMS
[Anxiety] : anxiety [Negative] : Musculoskeletal [Fever] : no fever [Night Sweats] : no night sweats [Chest Pain] : no chest pain [Palpitations] : no palpitations [Depression] : no depression

## 2021-12-24 NOTE — ASSESSMENT
[FreeTextEntry1] : HTN   now controlled  Advised to continue current  regime as noted in HPI.\par \par Seizure DO  advised Fu as noted and keep diary of observed seizures during sleep. \par \par Best wishes offered.\par \par FU after neurology.

## 2021-12-27 ENCOUNTER — APPOINTMENT (OUTPATIENT)
Dept: CARDIOLOGY | Facility: CLINIC | Age: 56
End: 2021-12-27

## 2022-01-07 ENCOUNTER — NON-APPOINTMENT (OUTPATIENT)
Age: 57
End: 2022-01-07

## 2022-01-07 ENCOUNTER — APPOINTMENT (OUTPATIENT)
Dept: NEUROLOGY | Facility: CLINIC | Age: 57
End: 2022-01-07
Payer: COMMERCIAL

## 2022-01-07 PROCEDURE — 95819 EEG AWAKE AND ASLEEP: CPT

## 2022-01-07 PROCEDURE — 93040 RHYTHM ECG WITH REPORT: CPT

## 2022-01-10 ENCOUNTER — APPOINTMENT (OUTPATIENT)
Dept: NEUROLOGY | Facility: CLINIC | Age: 57
End: 2022-01-10
Payer: COMMERCIAL

## 2022-01-10 ENCOUNTER — APPOINTMENT (OUTPATIENT)
Dept: NEUROLOGY | Facility: CLINIC | Age: 57
End: 2022-01-10

## 2022-01-10 DIAGNOSIS — R56.9 UNSPECIFIED CONVULSIONS: ICD-10-CM

## 2022-01-10 PROCEDURE — 99205 OFFICE O/P NEW HI 60 MIN: CPT | Mod: 95

## 2022-03-15 ENCOUNTER — APPOINTMENT (OUTPATIENT)
Dept: NEUROLOGY | Facility: CLINIC | Age: 57
End: 2022-03-15
Payer: COMMERCIAL

## 2022-03-15 PROCEDURE — 99215 OFFICE O/P EST HI 40 MIN: CPT | Mod: 95

## 2022-03-15 RX ORDER — CENOBAMATE 12.5-25MG
14 X 12.5 MG & KIT ORAL
Qty: 28 | Refills: 0 | Status: COMPLETED | COMMUNITY
Start: 2022-01-10 | End: 2022-03-15

## 2022-03-15 RX ORDER — CENOBAMATE 50MG-100MG
14 X 50 MG & KIT ORAL
Qty: 28 | Refills: 0 | Status: COMPLETED | COMMUNITY
Start: 2022-01-10 | End: 2022-03-15

## 2022-03-15 RX ORDER — CENOBAMATE 150-200 MG
14 X 150 MG & KIT ORAL
Qty: 28 | Refills: 0 | Status: COMPLETED | COMMUNITY
Start: 2022-02-22 | End: 2022-03-15

## 2022-03-15 NOTE — PHYSICAL EXAM
[FreeTextEntry1] : GENERAL PHYSICAL EXAM:\par GEN: no distress, normal affect\par HEENT: NCAT\par CV/PULM/ABD: deferred due to telehealth encounter \par \par NEUROLOGICAL EXAM:\par Awake and alert\par Clear speech\par Grossly looks in all directs\par No obvious facial asymmetry \par Hearing intact \par BL shoulder shrug intact \par Moves all EXTs spontaneously\par Ambulates independently\par  \par

## 2022-03-15 NOTE — ASSESSMENT
[FreeTextEntry1] : ANTONIO LOUIS is a 56 year-old RH male with a history of CAD s/p stent, HTN, HLD and headache who presents for follow-up for drug-resistant focal epilepsy. MRI/PET unremarkable. Normal EEG with scalp-negative seizures, now exclusively nocturnal.\par \par Continue to increase CNB. Taper off LEV as tolerated. Patient is not interested in pursing presurgical evaluation at this time since it will be difficult to lateralize/localize seizure focus. All questions and concerns answered and addressed in detail to patient's complete satisfaction. Patient verbalized understanding and agreed to plan.\par \par - Start CNB. Following titration schedule printed and provided to pt. Educated pt on side effects, including monitoring for rash daily. Stop taking CNB and call office immediately if rash appears. \par wk 1-2: 12.5mg daily  \par wk 3-4: 25mg daily \par wk 5-6: 50mg daily \par wk 7-8: 100mg daily (currently on wk 8)\par wk 9-10: 150mg daily\par wk 11 and onward: 200mg daily\par \par - decrease LEV ER 2500mg QHS by 500mg every week; pt is instructed to stop and resume the previous dosage if there is increase in sz frequency\par - continue ESL 1600mg QHS\par - f/u in April\par \par \par All relevant epilepsy AAN quality care measures were addressed and discussed with the patient.\par \par More than 50% of time spent counseling and educating patient about epilepsy specific safety issues including AED side effects and interactions, alcohol consumption, sleep deprivation, risks and driving privileges associated with the [place] Guidelines, interaction with contraceptives and how treatment may affect pregnancy, death related to seizures/SUDEP, seizure 1st aid and risks. Patient is educated on seizure precautions, including no driving, no operating machinery, no swimming or bathing, no climbing heights, or engage in any risky activities during which a seizure could cause further injury to pt or others. \par \par Patient's identify verified. Patient consented to the teleservice as stated below. Verbal consent given on 03/15/2022 by patient, ANTONIO LOUIS.\par \par Informed Consent for Telehealth Services During a Public Health Emergency\par \par By virtue of my participation in this telehealth visit, I am consenting to receive care through telehealth. Telehealth is the use of electronic information and communication technologies by providers to deliver health care to patients at a distance.\par \par I understand that any care provided to me through [employer]’s telehealth application (“the [employer] grace”) will incorporate security protocols to protect the privacy and security of my health information. If any other application is used to provide care to me, I understand that the technology may not contain appropriate security protocols to protect the privacy and security of my health information. My provider has explained to me the risks associated with the technology platforms that he or she is using to provide care to me. I acknowledge that there are potential risks associated with any technology used while obtaining care through telehealth, including, but not limited to, connectively interruptions, other technical difficulties, and unauthorized access by a third party to one’s health information. Despite these risks, I agree to participate in the telehealth encounter.\par \par I understand and agree that I or my healthcare provider may terminate a telehealth encounter at any time in the event of a technical malfunction.\par \par I also understand that my location determines where medicine is being practiced. As a result, I will inform my provider where I am located at the time of my telehealth visit.\par \par I understand that there may be costs associated with a telehealth visit. I agree that I am responsible for any fees associated with the telehealth services that I receive.\par \par This Informed Consent for Telehealth Services During a Public Health Emergency will remain in effect solely during the term of the public health emergency.\par \par By signing below I certify that:\par \par I have read or had this form read and/or had this form explained to me;\par \par I fully understand the contents of this document, including the risks and benefits of receiving telehealth services; and\par \par I have been given ample opportunity to discuss any questions I may have regarding the telehealth services and that all of my questions have been answered to my satisfaction

## 2022-05-20 ENCOUNTER — APPOINTMENT (OUTPATIENT)
Dept: NEUROLOGY | Facility: CLINIC | Age: 57
End: 2022-05-20

## 2022-05-25 RX ORDER — LEVETIRACETAM 500 MG/1
500 TABLET, FILM COATED, EXTENDED RELEASE ORAL
Qty: 180 | Refills: 1 | Status: COMPLETED | COMMUNITY
Start: 2020-10-21 | End: 2022-05-25

## 2022-06-07 ENCOUNTER — APPOINTMENT (OUTPATIENT)
Dept: NEUROLOGY | Facility: CLINIC | Age: 57
End: 2022-06-07

## 2022-06-14 ENCOUNTER — APPOINTMENT (OUTPATIENT)
Dept: NEUROLOGY | Facility: CLINIC | Age: 57
End: 2022-06-14
Payer: COMMERCIAL

## 2022-06-14 PROCEDURE — 99214 OFFICE O/P EST MOD 30 MIN: CPT | Mod: 95

## 2022-06-14 RX ORDER — CENOBAMATE 200 MG/1
200 TABLET, FILM COATED ORAL
Qty: 30 | Refills: 5 | Status: COMPLETED | COMMUNITY
Start: 2022-03-15 | End: 2022-06-14

## 2022-06-14 NOTE — HISTORY OF PRESENT ILLNESS
[Home] : at home, [unfilled] , at the time of the visit. [Medical Office: ([facility])___] : at the medical office located in  [Verbal consent obtained from patient] : the patient, [unfilled] [FreeTextEntry1] : LAST OFFICE VISIT: 3/15/22\par \par PCP: Dr. Weber\par \par INTERIM HISTORY:\par Self-discontinued CNB in May due to dizziness and extreme fatigue. Explained to pt this may be combined effect of sodium channel blocker, as he was also on ESL 1600mg QHS, but pt does not want to go back on CNB. Pt was instructed to reduce LEV ER 2500mg QHS when CNB was started, but he noted increased sz frequency when LEV was reduced to 1500mg QHS, so he increased it back o 2500mg QHS. Continues to have about 1 sz every night and more when under stress.\par \par CURRENT ASM:\par LEV ER 2500mg QHS\par ESL 1600mg QHS\par \par \par PRIOR HISTORY:\par 1/10/22: This is a 56 year-old RH male with a history of CAD s/p stent, HTN, HLD and headache who is referred by Tena Schulz NP for evaluation and management of drug-resistant focal epilepsy. Patient was previously followed by multiple epileptologists/neurologists, most recently by Dr. Lester at [facility].\par \par Epilepsy onset at age 21. Seizures were first noted during daytime, where pt would have facial tingling sensation, distorted facial movements, deep inhalation (sucking air into lungs) and hypersalivation. Remained awareness and able to respond during the seizures. Each seizure lasted 3-10s and occurred up to multiple times daily.\par \par In a few years, daytime seizures decreased in frequency, but nocturnal seizures started to emerge, and eventually transitioned to seize exclusively in sleep. Seizures at night are described as sudden arousal, sits up fully aware, whole-body tenses up, “sucking air through my clenched teeth”, excessive salivation, pressing his temples using his hands thinking this would suppress intensity of the seizure, guttural sound, heart rate increases. Multiple seizures have been recorded in the past in [facility] and [facility] without ictal pattern on scalp EEG. Of the 8 seizures [facility] captured, 2 seizures provided some lateralizing signs with immobile right hand in a semi-dystonic posturing while the left hand rubbed the face.\par \par Tried different ASMs, never achieved seizure freedom, up to 8-10 seizures per night. ESL has been most efficacious. Last managed by Dr. Lester, who discussed surgical options with pt, but pt was hesitant to pursue due to nonlesional imaging and scalp-negative EEG. High functioning . CURRENT ASM: LEV ER 3000mg QHS; ESL 1600mg QHS.\par \par === 3/15/22 ===\par Started on CNB at the last visit. Seeing some efficacy. However, getting increasingly drowsy in the daytime and would like to come off of one of the ASM. Self-decreased LEV ER from 3000mg QHS to 2500mg QHS Continues to seize 1-3 every night. CURRENT ASM: LEV ER 2500mg QHS; ESL 1600mg QHS; Second week of CNB 100mg QHS – started 1/2022.\par \par \par SEIZURE DESCRIPTION AND TYPE:\par Type #1\par Severity: scalp-negative seizures\par Onset: 20yo\par Quality & associated signs/symptoms: Sudden arousal and sits up from bed, facial tingling/tightening, facial grimacing, whole-body tenses up, “sucking air through my clenched teeth,” hypersalivation, preserved awareness during majority of the seizures. Two seizures recorded in the past had immobile right hand in a semi-dystonic posturing while the left hand rubbed the face.\par Duration: 3-10s when occurred in daytime, 15-45s in sleep\par Timing: max 8-10 sz/night -> currently 1-3 sz/night\par Modifying factors: unknown trigger \par Circadian Variation: currently only occurring in sleep, first sz usually 15m after falling asleep\par \par EPILEPSY TYPE: focal epilepsy\par HISTORY OF TONIC-CLONIC SZ: no\par HISTORY OF STATUS EPILEPTICUS: no  \par \par SEIZURE RISK FACTORS:\par Unknown FH or birth hx as he was adopted. No history of febrile seizure, TBI, CNS infection.\par \par PREVIOUS ASM:\par CZP - very drowsy\par LTG - worsened sz\par ZNS 100mg BID - inefficacious \par CBZ ER 800mg QHS - helpful, switched to ESL \par LCM – recurrence of daytime seizures\par PHT – inefficacious \par CNB 150mg QHS (also on ESL 1600mg QHS) – Jan to May of 2022, dizzy, very drowsy\par \par IMAGING: \par PET/MRI 9/13/17 ([facility]): no structural MRI or focal FDG correlate to symptoms. Few scattered supratentorial WM hyperintensities may correlate with age and/or cerebrovascular risk factors. Otherwise normal contrast MRI of the head.\par \par MRI w/o 3/4/2011 ([facility]): unremarkable \par \par NEUROPHYSIOLOGY:\par rEEG 1/7/22 ([facility]): independent, intermittent slowing in LT > RT\par \par vEEG 7/2017 ([facility]): This is a normal EEG capturing awake and asleep states. Four typical seizures were captured. They had no ictal EEG correlate, however the ictal semiology was stereotypical indicative of a surface negative EEG perhaps frontal lobe epilepsy.\par \par vEEG 5/23 – 5/24/2011 ([facility]): The semiology of the eight events was similar. He awoke from sleep looking confused and rubbed his face and head with both hands while making a guttural noise. In the two of the eight events, his right hand was immobile with a semi-dystonic posturing while rubbing his face with his left hand. The events lasted 20 to 30 seconds each. Following one of the events, nursing was present and he was noted to speak appropriately immediately after the seizure. The EEG for the eight events was also stereotyped with an onset marked by a diffuse slow wave followed by 1 to 2 seconds of attenuation before EMG artifact obscured the recording.

## 2022-06-14 NOTE — ASSESSMENT
[FreeTextEntry1] : ANTONIO LOUIS is a 56 year-old RH male with a history of CAD s/p stent, HTN, HLD and headache who presents for follow-up for drug-resistant focal epilepsy. MRI/PET unremarkable. Normal EEG with scalp-negative seizures, now exclusively nocturnal.\par \par Trial of CLB. Patient is not interested in pursing presurgical evaluation at this time since it will be difficult to lateralize/localize seizure focus, and he is a high-functioning . All questions and concerns answered and addressed in detail to patient's complete satisfaction. Patient verbalized understanding and agreed to plan.\par \par - cross-titrate LEV ER 2500mg QHS to CLB 20mg QHS (refer to detailed-titration schedule scanned under "Notes") \par - continue ESL 1600mg QHS\par - f/u in 1 month\par \par \par All relevant epilepsy AAN quality care measures were addressed and discussed with the patient.\par \par More than 50% of time spent counseling and educating patient about epilepsy specific safety issues including AED side effects and interactions, alcohol consumption, sleep deprivation, risks and driving privileges associated with the [facility] Guidelines, interaction with contraceptives and how treatment may affect pregnancy, death related to seizures/SUDEP, seizure 1st aid and risks. Patient is educated on seizure precautions, including no driving, no operating machinery, no swimming or bathing, no climbing heights, or engage in any risky activities during which a seizure could cause further injury to pt or others. \par \par Patient's identify verified. Patient consented to the teleservice as stated below. Verbal consent given on 06/14/2022 by patient, ANTONIO LOUIS.\par \par Informed Consent for Telehealth Services During a Public Health Emergency\par \par By virtue of my participation in this telehealth visit, I am consenting to receive care through telehealth. Telehealth is the use of electronic information and communication technologies by providers to deliver health care to patients at a distance.\par \par I understand that any care provided to me through [employer]’s telehealth application (“the [employer] grace”) will incorporate security protocols to protect the privacy and security of my health information. If any other application is used to provide care to me, I understand that the technology may not contain appropriate security protocols to protect the privacy and security of my health information. My provider has explained to me the risks associated with the technology platforms that he or she is using to provide care to me. I acknowledge that there are potential risks associated with any technology used while obtaining care through telehealth, including, but not limited to, connectively interruptions, other technical difficulties, and unauthorized access by a third party to one’s health information. Despite these risks, I agree to participate in the telehealth encounter.\par \par I understand and agree that I or my healthcare provider may terminate a telehealth encounter at any time in the event of a technical malfunction.\par \par I also understand that my location determines where medicine is being practiced. As a result, I will inform my provider where I am located at the time of my telehealth visit.\par \par I understand that there may be costs associated with a telehealth visit. I agree that I am responsible for any fees associated with the telehealth services that I receive.\par \par This Informed Consent for Telehealth Services During a Public Health Emergency will remain in effect solely during the term of the public health emergency.\par \par By signing below I certify that:\par \par I have read or had this form read and/or had this form explained to me;\par \par I fully understand the contents of this document, including the risks and benefits of receiving telehealth services; and\par \par I have been given ample opportunity to discuss any questions I may have regarding the telehealth services and that all of my questions have been answered to my satisfaction

## 2022-08-31 ENCOUNTER — APPOINTMENT (OUTPATIENT)
Dept: NEUROLOGY | Facility: CLINIC | Age: 57
End: 2022-08-31

## 2022-08-31 PROCEDURE — 99214 OFFICE O/P EST MOD 30 MIN: CPT | Mod: 95

## 2022-08-31 NOTE — ASSESSMENT
[FreeTextEntry1] : ANTONIO LOUIS is a 56 year-old RH male with a history of CAD s/p stent, HTN, HLD and headache who presents for follow-up for drug-resistant focal epilepsy. MRI/PET unremarkable. Normal EEG with scalp-negative seizures, now exclusively nocturnal.\par \par Worsening of seizures as we attempted to reduce dosage of LEV. Patient is not interested in pursing presurgical evaluation at this time since it will be difficult to lateralize/localize seizure focus, and he is a high-functioning . All questions and concerns answered and addressed in detail to patient's complete satisfaction. Patient verbalized understanding and agreed to plan.\par \par - resume LEV ER 2500mg QHS\par - increase CLB 15mg QHS to CLB 20mg QHS  \par - continue ESL 1600mg QHS\par - f/u in 3 months\par \par \par All relevant epilepsy AAN quality care measures were addressed and discussed with the patient.\par \par More than 50% of time spent counseling and educating patient about epilepsy specific safety issues including AED side effects and interactions, alcohol consumption, sleep deprivation, risks and driving privileges associated with the [facility] Guidelines, interaction with contraceptives and how treatment may affect pregnancy, death related to seizures/SUDEP, seizure 1st aid and risks. Patient is educated on seizure precautions, including no driving, no operating machinery, no swimming or bathing, no climbing heights, or engage in any risky activities during which a seizure could cause further injury to pt or others. \par \par Patient's identify verified. Patient consented to the teleservice as stated below. Verbal consent given on 8/31/2022 by patient, ANTONIO LOUIS.\par \par Informed Consent for Telehealth Services During a Public Health Emergency\par \par By virtue of my participation in this telehealth visit, I am consenting to receive care through telehealth. Telehealth is the use of electronic information and communication technologies by providers to deliver health care to patients at a distance.\par \par I understand that any care provided to me through [employer]’s telehealth application (“the [employer] grace”) will incorporate security protocols to protect the privacy and security of my health information. If any other application is used to provide care to me, I understand that the technology may not contain appropriate security protocols to protect the privacy and security of my health information. My provider has explained to me the risks associated with the technology platforms that he or she is using to provide care to me. I acknowledge that there are potential risks associated with any technology used while obtaining care through telehealth, including, but not limited to, connectively interruptions, other technical difficulties, and unauthorized access by a third party to one’s health information. Despite these risks, I agree to participate in the telehealth encounter.\par \par I understand and agree that I or my healthcare provider may terminate a telehealth encounter at any time in the event of a technical malfunction.\par \par I also understand that my location determines where medicine is being practiced. As a result, I will inform my provider where I am located at the time of my telehealth visit.\par \par I understand that there may be costs associated with a telehealth visit. I agree that I am responsible for any fees associated with the telehealth services that I receive.\par \par This Informed Consent for Telehealth Services During a Public Health Emergency will remain in effect solely during the term of the public health emergency.\par \par By signing below I certify that:\par \par I have read or had this form read and/or had this form explained to me;\par \par I fully understand the contents of this document, including the risks and benefits of receiving telehealth services; and\par \par I have been given ample opportunity to discuss any questions I may have regarding the telehealth services and that all of my questions have been answered to my satisfaction

## 2022-08-31 NOTE — HISTORY OF PRESENT ILLNESS
[Home] : at home, [unfilled] , at the time of the visit. [Medical Office: ([facility])___] : at the medical office located in  [Verbal consent obtained from patient] : the patient, [unfilled] [FreeTextEntry1] : LAST OFFICE VISIT: 6/14/22\par \par PCP: Dr. Weber\par \par INTERIM HISTORY:\par Attempted to switch LEV for CLB, but has had clusters of up to 5-6 nocturnal seizures daily as coming down on LEV. A few of the seizures were captured by home security camera, which pt shared. All seizures consist of brief (~20s) and stereotypical behavior of sudden arousal, loud vocalization, hands vigorously rubbing face and hair.\par \par CURRENT ASM:\par LEV ER 500mg QHS\par ESL 1600mg QHS\par CLB 15mg QHS – 6/2022\par \par \par PRIOR HISTORY:\par 1/10/22: This is a 56 year-old RH male with a history of CAD s/p stent, HTN, HLD and headache who is referred by Tena Schulz NP for evaluation and management of drug-resistant focal epilepsy. Patient was previously followed by multiple epileptologists/neurologists, most recently by Dr. Lester at [facility].\par \par Epilepsy onset at age 21. Seizures were first noted during daytime, where pt would have facial tingling sensation, distorted facial movements, deep inhalation (sucking air into lungs) and hypersalivation. Remained awareness and able to respond during the seizures. Each seizure lasted 3-10s and occurred up to multiple times daily.\par \par In a few years, daytime seizures decreased in frequency, but nocturnal seizures started to emerge, and eventually transitioned to seize exclusively in sleep. Seizures at night are described as sudden arousal, sits up fully aware, whole-body tenses up, “sucking air through my clenched teeth”, excessive salivation, pressing his temples using his hands thinking this would suppress intensity of the seizure, guttural sound, heart rate increases. Multiple seizures have been recorded in the past in [facility] and [facility] without ictal pattern on scalp EEG. Of the 8 seizures [facility] captured, 2 seizures provided some lateralizing signs with immobile right hand in a semi-dystonic posturing while the left hand rubbed the face.\par \par Tried different ASMs, never achieved seizure freedom, up to 8-10 seizures per night. ESL has been most efficacious. Last managed by Dr. Lester, who discussed surgical options with pt, but pt was hesitant to pursue due to nonlesional imaging and scalp-negative EEG. High functioning . CURRENT ASM: LEV ER 3000mg QHS; ESL 1600mg QHS.\par \par === 3/15/22 ===\par Started on CNB at the last visit. Seeing some efficacy. However, getting increasingly drowsy in the daytime and would like to come off of one of the ASM. Self-decreased LEV ER from 3000mg QHS to 2500mg QHS Continues to seize 1-3 every night. CURRENT ASM: LEV ER 2500mg QHS; ESL 1600mg QHS; Second week of CNB 100mg QHS – started 1/2022.\par \par === 6/14/22 ===\par Self-discontinued CNB in May due to dizziness and extreme fatigue. Explained to pt this may be combined effect of sodium channel blocker, as he was also on ESL 1600mg QHS, but pt does not want to go back on CNB. Pt was instructed to reduce LEV ER 2500mg QHS when CNB was started, but he noted increased sz frequency when LEV was reduced to 1500mg QHS, so he increased it back o 2500mg QHS. Continues to have about 1 sz every night and more when under stress. CURRENT ASM: LEV ER 2500mg QHS; ESL 1600mg QHS.\par \par \par SEIZURE DESCRIPTION AND TYPE:\par Type #1\par Severity: scalp-negative seizures\par Onset: 22yo\par Quality & associated signs/symptoms: Sudden arousal and sits up from bed, facial tingling/tightening, facial grimacing, whole-body tenses up, “sucking air through my clenched teeth,” hypersalivation, preserved awareness during majority of the seizures. Two seizures recorded in the past had immobile right hand in a semi-dystonic posturing while the left hand rubbed the face.\par Duration: 3-10s when occurred in daytime, 15-45s in sleep\par Timing: max 8-10 sz/night -> currently 1-3 sz/night\par Modifying factors: unknown trigger \par Circadian Variation: mostly in sleep, first sz usually 15m after falling asleep\par \par EPILEPSY TYPE: focal epilepsy\par HISTORY OF TONIC-CLONIC SZ: no\par HISTORY OF STATUS EPILEPTICUS: no  \par \par SEIZURE RISK FACTORS:\par Unknown FH or birth hx as he was adopted. No history of febrile seizure, TBI, CNS infection.\par \par PREVIOUS ASM:\par CZP - very drowsy\par LTG - worsened sz\par ZNS 100mg BID - inefficacious \par CBZ ER 800mg QHS - helpful, switched to ESL \par LCM – recurrence of daytime seizures\par PHT – inefficacious \par CNB 150mg QHS (also on ESL 1600mg QHS) – Jan to May of 2022, dizzy, very drowsy\par \par IMAGING: \par PET/MRI 9/13/17 ([facility]): no structural MRI or focal FDG correlate to symptoms. Few scattered supratentorial WM hyperintensities may correlate with age and/or cerebrovascular risk factors. Otherwise normal contrast MRI of the head.\par \par MRI w/o 3/4/2011 ([facility]): unremarkable \par \par NEUROPHYSIOLOGY:\par rEEG 1/7/22 ([facility]): independent, intermittent slowing in LT > RT\par \par vEEG 7/2017 ([facility]): This is a normal EEG capturing awake and asleep states. Four typical seizures were captured. They had no ictal EEG correlate, however the ictal semiology was stereotypical indicative of a surface negative EEG perhaps frontal lobe epilepsy.\par \par vEEG 5/23 – 5/24/2011 ([facility]): The semiology of the eight events was similar. He awoke from sleep looking confused and rubbed his face and head with both hands while making a guttural noise. In the two of the eight events, his right hand was immobile with a semi-dystonic posturing while rubbing his face with his left hand. The events lasted 20 to 30 seconds each. Following one of the events, nursing was present and he was noted to speak appropriately immediately after the seizure. The EEG for the eight events was also stereotyped with an onset marked by a diffuse slow wave followed by 1 to 2 seconds of attenuation before EMG artifact obscured the recording.

## 2022-10-03 ENCOUNTER — APPOINTMENT (OUTPATIENT)
Dept: NEUROLOGY | Facility: CLINIC | Age: 57
End: 2022-10-03

## 2022-10-03 PROCEDURE — 99215 OFFICE O/P EST HI 40 MIN: CPT | Mod: 95

## 2022-10-03 RX ORDER — CLOBAZAM 20 MG/1
20 TABLET ORAL AT BEDTIME
Qty: 30 | Refills: 5 | Status: COMPLETED | COMMUNITY
Start: 2022-08-31 | End: 2022-10-03

## 2022-10-03 RX ORDER — CLOBAZAM 10 MG/1
10 TABLET ORAL
Qty: 60 | Refills: 5 | Status: COMPLETED | COMMUNITY
Start: 2022-06-14 | End: 2022-10-03

## 2022-10-05 NOTE — ASSESSMENT
[FreeTextEntry1] : ANTONIO LOUIS is a 56 year-old RH male with a history of CAD s/p stent, HTN, HLD and headache who presents for follow-up for drug-resistant focal epilepsy. MRI/PET unremarkable. Normal EEG with scalp-negative seizures.\par \par Worsening of seizures, now occurring daytime and triggered by stimulation to oral-buccal/teeth, possible due to increase in CLB? Taper off CLB. Advised pt to start taking LEV ER BID instead of QHS. Pt going to [place] 10/7 and returning 10/9. Consider starting VPA DR after returning from this business trip. Due to nonlesional, surface-negative seizures and active career as a high-functional , pt was previously not interested in presurgical evaluation, but is now willing to consider. Also discussed dietary therapies, but it seems like pt is not ready to commit to a low-carb diet because of EtOH use. All questions and concerns answered and addressed in detail to patient's complete satisfaction. Patient verbalized understanding and agreed to plan.\par \par - taper off CLB 10mg QHS : 5mg QHS x1wk -> stop\par - continue LEV ER 2500mg QHS and advised to start 500-100mg in the morning for daytime seizures\par - continue ESL 1600mg QHS\par - start CZP 0.5mg prn 2 daytime seizures within 24h (cautioned pt about drowsiness) \par - discuss pt's case at [facility]\par     => bring up risk of injury at time of stereo-EEG with pt's ictal behavior (vigorous rubbing of hair)\par - f/u 10/10, after returning from business trip: start VPA?\par \par \par All relevant epilepsy AAN quality care measures were addressed and discussed with the patient.\par \par More than 50% of time spent counseling and educating patient about epilepsy specific safety issues including AED side effects and interactions, alcohol consumption, sleep deprivation, risks and driving privileges associated with the [place] Guidelines, interaction with contraceptives and how treatment may affect pregnancy, death related to seizures/SUDEP, seizure 1st aid and risks. Patient is educated on seizure precautions, including no driving, no operating machinery, no swimming or bathing, no climbing heights, or engage in any risky activities during which a seizure could cause further injury to pt or others. \par \par Patient's identify verified. Patient consented to the teleservice as stated below. Verbal consent given on 10/3/2022 by patient, ANTONIO LOUIS.\par \par Informed Consent for Telehealth Services During a Public Health Emergency\par \par By virtue of my participation in this telehealth visit, I am consenting to receive care through telehealth. Telehealth is the use of electronic information and communication technologies by providers to deliver health care to patients at a distance.\par \par I understand that any care provided to me through [employer]’s telehealth application (“the [employer]”) will incorporate security protocols to protect the privacy and security of my health information. If any other application is used to provide care to me, I understand that the technology may not contain appropriate security protocols to protect the privacy and security of my health information. My provider has explained to me the risks associated with the technology platforms that he or she is using to provide care to me. I acknowledge that there are potential risks associated with any technology used while obtaining care through telehealth, including, but not limited to, connectively interruptions, other technical difficulties, and unauthorized access by a third party to one’s health information. Despite these risks, I agree to participate in the telehealth encounter.\par \par I understand and agree that I or my healthcare provider may terminate a telehealth encounter at any time in the event of a technical malfunction.\par \par I also understand that my location determines where medicine is being practiced. As a result, I will inform my provider where I am located at the time of my telehealth visit.\par \par I understand that there may be costs associated with a telehealth visit. I agree that I am responsible for any fees associated with the telehealth services that I receive.\par \par This Informed Consent for Telehealth Services During a Public Health Emergency will remain in effect solely during the term of the public health emergency.\par \par By signing below I certify that:\par \par I have read or had this form read and/or had this form explained to me;\par \par I fully understand the contents of this document, including the risks and benefits of receiving telehealth services; and\par \par I have been given ample opportunity to discuss any questions I may have regarding the telehealth services and that all of my questions have been answered to my satisfaction

## 2022-10-05 NOTE — HISTORY OF PRESENT ILLNESS
[FreeTextEntry1] : LAST OFFICE VISIT: 8/31/22\par \par PCP: Dr. Weber\par \par INTERIM HISTORY:\par At the last visit, CLB increased from 15mg QHS to 20mg QHS and LEV ER resumed back to 2500mg QHS. Since then, pt has had increasing daytime seizures with similar semiology, but curiously triggered by stimulation to the oral-buccal and teeth areas (eg. eating/drinking/flossing/brushing/rubbbing the temples and cheeks a certain way). Pt is extremely exhausted and anxious about seizures. Now amenable to exploring surgical options.\par \par CURRENT ASM:\par LEV ER 2500mg QHS\par ESL 1600mg QHS\par CLB 10mg QHS – 6/2022, inefficacious, fatigue, recurrence of daytime seizures, possibly brought on reflex seizures\par \par \par PRIOR HISTORY:\par 1/10/22: This is a 56 year-old RH male with a history of CAD s/p stent, HTN, HLD and headache who is referred by Tena Schulz NP for evaluation and management of drug-resistant focal epilepsy. Patient was previously followed by multiple epileptologists/neurologists, most recently by Dr. Lester at [facility].\par \par Epilepsy onset at age 21. Seizures were first noted during daytime, where pt would have facial tingling sensation, distorted facial movements, deep inhalation (sucking air into lungs) and hypersalivation. Remained awareness and able to respond during the seizures. Each seizure lasted 3-10s and occurred up to multiple times daily.\par \par In a few years, daytime seizures decreased in frequency, but nocturnal seizures started to emerge, and eventually transitioned to seize exclusively in sleep. Seizures at night are described as sudden arousal, sits up fully aware, whole-body tenses up, “sucking air through my clenched teeth”, excessive salivation, pressing his temples using his hands thinking this would suppress intensity of the seizure, guttural sound, heart rate increases. Multiple seizures have been recorded in the past in [facility] and [facility] without ictal pattern on scalp EEG. Of the 8 seizures [facility] captured, 2 seizures provided some lateralizing signs with immobile right hand in a semi-dystonic posturing while the left hand rubbed the face.\par \par Tried different ASMs, never achieved seizure freedom, up to 8-10 seizures per night. ESL has been most efficacious. Last managed by Dr. Lester, who discussed surgical options with pt, but pt was hesitant to pursue due to nonlesional imaging and scalp-negative EEG. High functioning . CURRENT ASM: LEV ER 3000mg QHS; ESL 1600mg QHS.\par \par === 3/15/22 ===\par Started on CNB at the last visit. Seeing some efficacy. However, getting increasingly drowsy in the daytime and would like to come off of one of the ASM. Self-decreased LEV ER from 3000mg QHS to 2500mg QHS Continues to seize 1-3 every night. CURRENT ASM: LEV ER 2500mg QHS; ESL 1600mg QHS; Second week of CNB 100mg QHS – started 1/2022.\par \par === 6/14/22 ===\par Self-discontinued CNB in May due to dizziness and extreme fatigue. Explained to pt this may be combined effect of sodium channel blocker, as he was also on ESL 1600mg QHS, but pt does not want to go back on CNB. Pt was instructed to reduce LEV ER 2500mg QHS when CNB was started, but he noted increased sz frequency when LEV was reduced to 1500mg QHS, so he increased it back o 2500mg QHS. Continues to have about 1 sz every night and more when under stress. CURRENT ASM: LEV ER 2500mg QHS; ESL 1600mg QHS.\par \par === 8/31/22 ===\par Attempted to switch LEV for CLB, but has had clusters of up to 5-6 nocturnal seizures daily as coming down on LEV. A few of the seizures were captured by home security camera, which pt shared. All seizures consist of brief (~20s) and stereotypical behavior of sudden arousal, loud vocalization, hands vigorously rubbing face and hair. CURRENT ASM: LEV ER 500mg QHS; ESL 1600mg QHS; CLB 15mg QHS – 6/2022.\par \par \par SEIZURE DESCRIPTION AND TYPE:\par Type #1\par Severity: scalp-negative seizures\par Onset: 20yo\par Quality & associated signs/symptoms: Sudden arousal and sits up from bed, facial tingling/tightening, facial grimacing, whole-body tenses up, guttural vocalization, “sucking air through my clenched teeth,” +/- up-rising sensation in abd, +/- hypersalivation or spitting, preserved awareness during majority of the seizures. Some seizures with bilateral hands vigorously rubbing the head or face, some with immobile right hand in a semi-dystonic posturing while the left hand rubs the head/face.\par Duration: 3-10s when occurred in daytime, 15-45s in sleep\par Timing: max 8-10 sz/night -> now 1-3 sz/night\par Modifying factors: while on CLB, triggers include eating/drinking/flossing/brushing teeth\par Circadian Variation: mostly in sleep, first sz usually 15m after falling asleep\par \par EPILEPSY TYPE: focal epilepsy\par HISTORY OF TONIC-CLONIC SZ: no\par HISTORY OF STATUS EPILEPTICUS: no  \par \par SEIZURE RISK FACTORS:\par Unknown FH or birth hx as he was adopted. No history of febrile seizure, TBI, CNS infection.\par \par PREVIOUS ASM:\par CZP - very drowsy\par LTG - worsened sz\par ZNS 100mg BID - inefficacious \par CBZ ER 800mg QHS - helpful, switched to ESL \par LCM – recurrence of daytime seizures\par PHT – inefficacious \par CNB 150mg QHS (also on ESL 1600mg QHS) – Jan to May of 2022, dizzy, very drowsy, not willing to decrease ESL \par \par IMAGING: \par PET/MRI 9/13/17 ([facility]): no structural MRI or focal FDG correlate to symptoms. Few scattered supratentorial WM hyperintensities may correlate with age and/or cerebrovascular risk factors. Otherwise normal contrast MRI of the head.\par \par MRI w/o 3/4/2011 ([facility]): unremarkable \par \par NEUROPHYSIOLOGY:\par rEEG 1/7/22 ([facility]): independent, intermittent slowing in LT > RT\par \par vEEG 7/2017 ([facility]): This is a normal EEG capturing awake and asleep states. Four typical seizures were captured. They had no ictal EEG correlate, however the ictal semiology was stereotypical indicative of a surface negative EEG perhaps frontal lobe epilepsy.\par \par vEEG 5/23 – 5/24/2011 ([facility]): The semiology of the eight events was similar. He awoke from sleep looking confused and rubbed his face and head with both hands while making a guttural noise. In the two of the eight events, his right hand was immobile with a semi-dystonic posturing while rubbing his face with his left hand. The events lasted 20 to 30 seconds each. Following one of the events, nursing was present and he was noted to speak appropriately immediately after the seizure. The EEG for the eight events was also stereotyped with an onset marked by a diffuse slow wave followed by 1 to 2 seconds of attenuation before EMG artifact obscured the recording.

## 2022-10-05 NOTE — HISTORY OF PRESENT ILLNESS
[Home] : at home, [unfilled] , at the time of the visit. [Medical Office: ([facility])___] : at the medical office located in  [Verbal consent obtained from patient] : the patient, [unfilled] [FreeTextEntry1] : PCP: Dr. Weber\par \par This is a 56 year-old RH male with a history of CAD s/p stent, HTN, HLD and headache who is referred by Tena Schulz NP for evaluation and management of drug-resistant focal epilepsy. Patient was previously followed by multiple epileptologists/neurologists, most recently by Dr. Lester at [facility].\par \par Epilepsy onset in his early 20s, characterized by scalp-negative nocturnal hypermotor seizures. Tried multiple different ASMs, never achieved seizure freedom, up to 8-10 seizures per night. Last managed by Dr. Lester, who has discussed surgical options with pt, but pt was hesitant to pursue d/t nonlesional imaging and scalp-negative EEG. High functioning . \par \par SEIZURE DESCRIPTION AND TYPE:\par Type #1\par Severity: scalp-negative hypermotor seizures\par Onset: 20yo\par Quality & associated signs/symptoms: hypermotor activity, jumps up from bed\par Duration: 15-60s\par Timing: max 8-10 sz/night -> currently 1-2 sz/night\par Modifying factors: unknown trigger \par Circadian Variation: in sleep\par \par EPILEPSY TYPE: focal epilepsy\par HISTORY OF TONIC-CLONIC SZ: no\par HISTORY OF STATUS EPILEPTICUS: no  \par \par SEIZURE RISK FACTORS:\par Unknown FH or birth hx as he was adopted. No history of febrile seizure, TBI, CNS infection.\par \par CURRENT ASMs:\par LEV ER 3000mg QHS\par ESL 1600mg QHS\par \par PREVIOUS ASMs:\par CZP - very drowsy\par LTG - worsened sz\par ZNS 100mg BID - inefficacious \par CBZ ER 800mg QHS - helpful, switched to ESL \par LCM - for a week and stopped for unclear reason\par \par IMAGING: \par Has had MRIs in the past; nonlesional per pt\par \par NEUROPHYSIOLOGY:\par rEEG 1/7/22 ([facility]): independent, intermittent slowing in LT > RT\par Multiple EEGs in the past: scalp-negative seizures

## 2022-10-05 NOTE — ASSESSMENT
[FreeTextEntry1] : ANTONIO LOUIS is a 56 year-old RH male with a history of CAD s/p stent, HTN, HLD and headache who presents to establish care for drug-resistant nocturnal focal epilepsy. Per pt, nonlesional MRI, scalp-EEG negative seizures. \par \par Prior epileptologist has discussed surgical options with the pt, but pt is hesitant since it will be difficult to localize seizure focus. Opt to try CNB first, and then reexplore surgery if needed. All questions and concerns answered and addressed in detail to patient's complete satisfaction. Patient verbalized understanding and agreed to plan.\par \par - Start CNB. Following titration schedule printed and provided to pt. Educated pt on side effects, including monitoring for rash daily. Stop taking CNB and call office immediately if rash appears. \par wk 1-2: 12.5mg daily  \par wk 3-4: 25mg daily \par wk 5-6: 50mg daily \par wk 7-8: 100mg daily \par wk 9-10: 150mg daily\par wk 11 and onward: 200mg daily\par \par - continue LEV ER 3000mg QHS\par - continue ESL 1600mg QHS\par - obtain record from prior epileptologists \par - f/u in 3 months \par \par \par All relevant epilepsy AAN quality care measures were addressed and discussed with the patient.\par \par More than 50% of time spent counseling and educating patient about epilepsy specific safety issues including AED side effects and interactions, alcohol consumption, sleep deprivation, risks and driving privileges associated with the [place] Guidelines, interaction with contraceptives and how treatment may affect pregnancy, death related to seizures/SUDEP, seizure 1st aid and risks. Patient is educated on seizure precautions, including no driving, no operating machinery, no swimming or bathing, no climbing heights, or engage in any risky activities during which a seizure could cause further injury to pt or others. \par \par Patient's identify verified. Patient consented to the teleservice as stated below. Verbal consent given on 01/10/2022 by patient, ANTONIO LOUIS.\par \par Informed Consent for Telehealth Services During a Public Health Emergency\par \par By virtue of my participation in this telehealth visit, I am consenting to receive care through telehealth. Telehealth is the use of electronic information and communication technologies by providers to deliver health care to patients at a distance.\par \par I understand that any care provided to me through [employer]s telehealth application (“the [employer] grace”) will incorporate security protocols to protect the privacy and security of my health information. If any other application is used to provide care to me, I understand that the technology may not contain appropriate security protocols to protect the privacy and security of my health information. My provider has explained to me the risks associated with the technology platforms that he or she is using to provide care to me. I acknowledge that there are potential risks associated with any technology used while obtaining care through telehealth, including, but not limited to, connectively interruptions, other technical difficulties, and unauthorized access by a third party to one’s health information. Despite these risks, I agree to participate in the telehealth encounter.\par \par I understand and agree that I or my healthcare provider may terminate a telehealth encounter at any time in the event of a technical malfunction.\par \par I also understand that my location determines where medicine is being practiced. As a result, I will inform my provider where I am located at the time of my telehealth visit.\par \par I understand that there may be costs associated with a telehealth visit. I agree that I am responsible for any fees associated with the telehealth services that I receive.\par \par This Informed Consent for Telehealth Services During a Public Health Emergency will remain in effect solely during the term of the public health emergency.\par \par By signing below I certify that:\par \par I have read or had this form read and/or had this form explained to me;\par \par I fully understand the contents of this document, including the risks and benefits of receiving telehealth services; and\par \par I have been given ample opportunity to discuss any questions I may have regarding the telehealth services and that all of my questions have been answered to my satisfaction

## 2022-10-11 ENCOUNTER — APPOINTMENT (OUTPATIENT)
Dept: NEUROLOGY | Facility: CLINIC | Age: 57
End: 2022-10-11

## 2022-10-11 ENCOUNTER — APPOINTMENT (OUTPATIENT)
Dept: FAMILY MEDICINE | Facility: CLINIC | Age: 57
End: 2022-10-11

## 2022-10-11 ENCOUNTER — NON-APPOINTMENT (OUTPATIENT)
Age: 57
End: 2022-10-11

## 2022-10-11 VITALS
HEART RATE: 62 BPM | OXYGEN SATURATION: 97 % | WEIGHT: 218 LBS | RESPIRATION RATE: 16 BRPM | BODY MASS INDEX: 31.21 KG/M2 | HEIGHT: 70 IN | SYSTOLIC BLOOD PRESSURE: 122 MMHG | TEMPERATURE: 97.5 F | DIASTOLIC BLOOD PRESSURE: 78 MMHG

## 2022-10-11 DIAGNOSIS — Z51.81 ENCOUNTER FOR THERAPEUTIC DRUG LVL MONITORING: ICD-10-CM

## 2022-10-11 DIAGNOSIS — I25.10 ATHEROSCLEROTIC HEART DISEASE OF NATIVE CORONARY ARTERY W/OUT ANGINA PECTORIS: ICD-10-CM

## 2022-10-11 DIAGNOSIS — Z87.898 PERSONAL HISTORY OF OTHER SPECIFIED CONDITIONS: ICD-10-CM

## 2022-10-11 PROCEDURE — 99214 OFFICE O/P EST MOD 30 MIN: CPT

## 2022-10-11 PROCEDURE — 99214 OFFICE O/P EST MOD 30 MIN: CPT | Mod: 95

## 2022-10-11 RX ORDER — OXYCODONE 5 MG/1
5 TABLET ORAL
Refills: 0 | Status: DISCONTINUED | COMMUNITY
End: 2022-10-11

## 2022-10-11 RX ORDER — CYCLOBENZAPRINE HYDROCHLORIDE 7.5 MG/1
7.5 TABLET, FILM COATED ORAL 3 TIMES DAILY
Qty: 15 | Refills: 0 | Status: DISCONTINUED | COMMUNITY
Start: 2021-12-13 | End: 2022-10-11

## 2022-10-17 PROBLEM — Z87.898 HISTORY OF HEADACHE: Status: RESOLVED | Noted: 2021-12-13 | Resolved: 2022-10-17

## 2022-10-17 PROBLEM — Z51.81 THERAPEUTIC DRUG MONITORING: Status: RESOLVED | Noted: 2022-10-11 | Resolved: 2022-10-17

## 2022-10-17 NOTE — HISTORY OF PRESENT ILLNESS
[FreeTextEntry1] : F/U - not fasting \par labetalol, valsartan, tamsulosin refills needed \par Last seen in Dec. 2021 and encounter reviewed. \par \par Since has been following with Neuro, Dr. SWAIN with regard to seizures and medication adjustments. \par Multiple changes and new meds. to attempt amelioration /control of  seizure activity as noted on home /phone grace \par \par NEURO notes reviewed and appreciated. \par Currently  ROHINI is taking  Aptiom/ Depakote/clonazepam/ Keppra. \par \par "Doing OK"  Less seizures  however "tired all the time". \par \par Requesting new cardiologist ; "just don't care for Dabhi".    [de-identified] : in review: Dec. 2021 \par Reason for Visit:. Post-hospitalization from [facility]. \par Date(s) of hospitalization: The patient was admitted on 12/10/21. \par Brief Hospital Course: As per patient: \par Was out of Valsartan for about 5-7 days: "Pharmacy screwed up". \par \par Was experiencing severe headaches which were always the same; behind both ears and radiating posteriorly surrounding scalp for about 10 days. \par Was traveling to [place] for work and "just feeling awful" \par Patient also seen at [facility] ER in [place] on 12/8/21.\par \par Treated with IV labetalol and Tylenol for "several rounds" and BP normalized and he was discharged on Valsartan ( at usual dose ). NO IMAGING or LABS done. \par \par Returned on December 10 and was sent to [facility] by RN at cardiologist office as BP on admission to office was 230/120\par \par ER discharge summary for [facility] reviewed : \par Treated with Labetalol x 2 and oxycodone for H/A and also CT of Head. Unremarkable. \par \par Cardiology encounter yesterday and reviewed and noted for STOP METOPROLOL; Labetalol 200 mg BID \par Cyclobenzaprine for "neck " which he opined is causing H/A. \par \par ALso advised Neurology Consult and MRI EEG as per MD in [place]; \par He has appt. with Dr. SWAIN in [place] in early January.\par \par Neurology ON from Dr. Lester, [facility], of March 18, 2020 for daily nightly seizures usually during sleep for many years reviewed (after my request for ON).

## 2022-10-17 NOTE — HEALTH RISK ASSESSMENT
[Never] : Never [Yes] : Yes [2 - 3 times a week (3 pts)] : 2 - 3  times a week (3 points) [3 or 4 (1 pt)] : 3 or 4  (1 point) [Never (0 pts)] : Never (0 points) [No] : In the past 12 months have you used drugs other than those required for medical reasons? No [No falls in past year] : Patient reported no falls in the past year [0] : 2) Feeling down, depressed, or hopeless: Not at all (0) [PHQ-2 Negative - No further assessment needed] : PHQ-2 Negative - No further assessment needed [Audit-CScore] : 4 [[id number]] : 0

## 2022-10-17 NOTE — PHYSICAL EXAM
[No Acute Distress] : no acute distress [Normal Sclera/Conjunctiva] : normal sclera/conjunctiva [PERRL] : pupils equal round and reactive to light [No JVD] : no jugular venous distention [No Respiratory Distress] : no respiratory distress  [No Accessory Muscle Use] : no accessory muscle use [Clear to Auscultation] : lungs were clear to auscultation bilaterally [Regular Rhythm] : with a regular rhythm [Normal S1, S2] : normal S1 and S2 [No Joint Swelling] : no joint swelling [Grossly Normal Strength/Tone] : grossly normal strength/tone [Coordination Grossly Intact] : coordination grossly intact [No Focal Deficits] : no focal deficits [Deep Tendon Reflexes (DTR)] : deep tendon reflexes were 2+ and symmetric [Alert and Oriented x3] : oriented to person, place, and time [Normal Insight/Judgement] : insight and judgment were intact [de-identified] : a bit weary appearing  engaging  [de-identified] : ADITI

## 2022-10-17 NOTE — ASSESSMENT
[FreeTextEntry1] : 56 year old WM with considerable PMH of epilepsy now following with neurologist local \par ( was going into [place]) ;  Encouraged to continue! \par \par PMH of  HTN/HLD /Psoriasis CAD s/p stent 2015. \par Some events of uncontrolled HTN however endorses compliance with medications and doing well. \par Tries to continue his routine of regular walks and healthy diet. \par \par Need labs as well as refills.\par \par Referral provided to new cardiologist \par \par Best wishes offered!\par \par \par \par

## 2022-10-24 DIAGNOSIS — E87.1 HYPO-OSMOLALITY AND HYPONATREMIA: ICD-10-CM

## 2022-10-24 LAB
ALBUMIN SERPL ELPH-MCNC: 4.7 G/DL
ALP BLD-CCNC: 89 U/L
ALT SERPL-CCNC: 38 U/L
ANION GAP SERPL CALC-SCNC: 12 MMOL/L
AST SERPL-CCNC: 26 U/L
BASOPHILS # BLD AUTO: 0.03 K/UL
BASOPHILS NFR BLD AUTO: 0.7 %
BILIRUB SERPL-MCNC: 0.4 MG/DL
BUN SERPL-MCNC: 10 MG/DL
CALCIUM SERPL-MCNC: 9.3 MG/DL
CHLORIDE SERPL-SCNC: 93 MMOL/L
CHOLEST SERPL-MCNC: 163 MG/DL
CO2 SERPL-SCNC: 23 MMOL/L
CREAT SERPL-MCNC: 0.88 MG/DL
EGFR: 100 ML/MIN/1.73M2
EOSINOPHIL # BLD AUTO: 0.13 K/UL
EOSINOPHIL NFR BLD AUTO: 2.9 %
ESTIMATED AVERAGE GLUCOSE: 120 MG/DL
GLUCOSE SERPL-MCNC: 112 MG/DL
HBA1C MFR BLD HPLC: 5.8 %
HCT VFR BLD CALC: 39.4 %
HDLC SERPL-MCNC: 70 MG/DL
HGB BLD-MCNC: 14.4 G/DL
IMM GRANULOCYTES NFR BLD AUTO: 0.5 %
LDLC SERPL CALC-MCNC: 60 MG/DL
LYMPHOCYTES # BLD AUTO: 0.92 K/UL
LYMPHOCYTES NFR BLD AUTO: 20.8 %
MAN DIFF?: NORMAL
MCHC RBC-ENTMCNC: 32.1 PG
MCHC RBC-ENTMCNC: 36.5 GM/DL
MCV RBC AUTO: 87.8 FL
MONOCYTES # BLD AUTO: 0.69 K/UL
MONOCYTES NFR BLD AUTO: 15.6 %
NEUTROPHILS # BLD AUTO: 2.64 K/UL
NEUTROPHILS NFR BLD AUTO: 59.5 %
NONHDLC SERPL-MCNC: 92 MG/DL
PLATELET # BLD AUTO: 143 K/UL
POTASSIUM SERPL-SCNC: 4.3 MMOL/L
PROT SERPL-MCNC: 6.7 G/DL
RBC # BLD: 4.49 M/UL
RBC # FLD: 11.8 %
SODIUM SERPL-SCNC: 128 MMOL/L
TRIGL SERPL-MCNC: 163 MG/DL
WBC # FLD AUTO: 4.43 K/UL

## 2022-10-24 RX ORDER — DIVALPROEX SODIUM 500 MG/1
500 TABLET, DELAYED RELEASE ORAL AT BEDTIME
Qty: 30 | Refills: 3 | Status: COMPLETED | COMMUNITY
Start: 2022-10-11 | End: 2022-10-24

## 2022-10-24 NOTE — ASSESSMENT
[FreeTextEntry1] : ANTONIO LOUIS is a 56 year-old RH male with a history of CAD s/p stent, HTN, HLD and headache who presents for follow-up for drug-resistant focal epilepsy. MRI/PET unremarkable. Normal EEG with scalp-negative seizures.\par \par Trial of VPA. Due to nonlesional, surface-negative seizures and active career as a high-functioning , pt was previously not interested in presurgical evaluation, but is now willing to consider if he fails VPA. [place] [facility] and the recommendation is to obtain iSPECT prior to iEEG. Will need to take precaution to prevent self-injury during iEEG given ictal behavior of vigorous rubbing of scalp. Dietary therapy is another option, but would be difficult to commit to. All questions and concerns answered and addressed in detail to patient's complete satisfaction. Patient verbalized understanding and agreed to plan.\par \par - start VPA DR: 125mg QHS x1wk -> 250mg QHS x1wk -> 500mg QHS \par - continue LEV ER 2500mg QHS\par - continue ESL 1600mg QHS\par - LEV/CBZ/OXC levels\par - f/u few weeks later: further evaluate if pt can tolerate high VPA dosage w/o fatigue\par \par \par All relevant epilepsy AAN quality care measures were addressed and discussed with the patient.\par \par More than 50% of time spent counseling and educating patient about epilepsy specific safety issues including AED side effects and interactions, alcohol consumption, sleep deprivation, risks and driving privileges associated with the [place] Guidelines, interaction with contraceptives and how treatment may affect pregnancy, death related to seizures/SUDEP, seizure 1st aid and risks. Patient is educated on seizure precautions, including no driving, no operating machinery, no swimming or bathing, no climbing heights, or engage in any risky activities during which a seizure could cause further injury to pt or others. \par \par Patient's identify verified. Patient consented to the teleservice as stated below. Verbal consent given on 10/11/2022 by patient, ANTONIO LOUIS.\par \par Informed Consent for Telehealth Services During a Public Health Emergency\par \par By virtue of my participation in this telehealth visit, I am consenting to receive care through telehealth. Telehealth is the use of electronic information and communication technologies by providers to deliver health care to patients at a distance.\par \par I understand that any care provided to me through [employer]s telehealth application (“the [employer] grace”) will incorporate security protocols to protect the privacy and security of my health information. If any other application is used to provide care to me, I understand that the technology may not contain appropriate security protocols to protect the privacy and security of my health information. My provider has explained to me the risks associated with the technology platforms that he or she is using to provide care to me. I acknowledge that there are potential risks associated with any technology used while obtaining care through telehealth, including, but not limited to, connectively interruptions, other technical difficulties, and unauthorized access by a third party to one’s health information. Despite these risks, I agree to participate in the telehealth encounter.\par \par I understand and agree that I or my healthcare provider may terminate a telehealth encounter at any time in the event of a technical malfunction.\par \par I also understand that my location determines where medicine is being practiced. As a result, I will inform my provider where I am located at the time of my telehealth visit.\par \par I understand that there may be costs associated with a telehealth visit. I agree that I am responsible for any fees associated with the telehealth services that I receive.\par \par This Informed Consent for Telehealth Services During a Public Health Emergency will remain in effect solely during the term of the public health emergency.\par \par By signing below I certify that:\par \par I have read or had this form read and/or had this form explained to me;\par \par I fully understand the contents of this document, including the risks and benefits of receiving telehealth services; and\par \par I have been given ample opportunity to discuss any questions I may have regarding the telehealth services and that all of my questions have been answered to my satisfaction

## 2022-10-24 NOTE — HISTORY OF PRESENT ILLNESS
[Home] : at home, [unfilled] , at the time of the visit. [Medical Office: ([facility])___] : at the medical office located in  [Verbal consent obtained from patient] : the patient, [unfilled] [FreeTextEntry1] : LAST OFFICE VISIT: 10/3/22\par \par PCP: Dr. Weber\par \par INTERIM HISTORY:\par Tapered off CLB. Back on old regimen of LEV ER and ESL. Seizures have reverted back to nocturnal only, same frequency as before (1-3/night). His case was discussed at [facility], and the recommendation is to obtain iSPECT to help further lateralize/localize sz focus. However, pt would like to try VPA before moving forward with pre-surg eval. \par \par CURRENT ASM:\par LEV ER 2500mg QHS\par ESL 1600mg QHS\par \par \par PRIOR HISTORY:\par 1/10/22: This is a 56 year-old RH male with a history of CAD s/p stent, HTN, HLD and headache who is referred by Tena Schulz NP for evaluation and management of drug-resistant focal epilepsy. Patient was previously followed by multiple epileptologists/neurologists, most recently by Dr. Lester at [facility].\par \par Epilepsy onset at age 21. Seizures were first noted during daytime, where pt would have facial tingling sensation, distorted facial movements, deep inhalation (sucking air into lungs) and hypersalivation. Remained awareness and able to respond during the seizures. Each seizure lasted 3-10s and occurred up to multiple times daily.\par \par In a few years, daytime seizures decreased in frequency, but nocturnal seizures started to emerge, and eventually transitioned to seize exclusively in sleep. Seizures at night are described as sudden arousal, sits up fully aware, whole-body tenses up, “sucking air through my clenched teeth”, excessive salivation, pressing his temples using his hands thinking this would suppress intensity of the seizure, guttural sound, heart rate increases. Multiple seizures have been recorded in the past in [facility] and [facility] without ictal pattern on scalp EEG. Of the 8 seizures [facility] captured, 2 seizures provided some lateralizing signs with immobile right hand in a semi-dystonic posturing while the left hand rubbed the face.\par \par Tried different ASMs, never achieved seizure freedom, up to 8-10 seizures per night. ESL has been most efficacious. Last managed by Dr. Lester, who discussed surgical options with pt, but pt was hesitant to pursue due to nonlesional imaging and scalp-negative EEG. High functioning . CURRENT ASM: LEV ER 3000mg QHS; ESL 1600mg QHS.\par \par === 3/15/22 ===\par Started on CNB at the last visit. Seeing some efficacy. However, getting increasingly drowsy in the daytime and would like to come off of one of the ASM. Self-decreased LEV ER from 3000mg QHS to 2500mg QHS Continues to seize 1-3 every night. CURRENT ASM: LEV ER 2500mg QHS; ESL 1600mg QHS; Second week of CNB 100mg QHS – started 1/2022.\par \par === 6/14/22 ===\par Self-discontinued CNB in May due to dizziness and extreme fatigue. Explained to pt this may be combined effect of sodium channel blocker, as he was also on ESL 1600mg QHS, but pt does not want to go back on CNB. Pt was instructed to reduce LEV ER 2500mg QHS when CNB was started, but he noted increased sz frequency when LEV was reduced to 1500mg QHS, so he increased it back o 2500mg QHS. Continues to have about 1 sz every night and more when under stress. CURRENT ASM: LEV ER 2500mg QHS; ESL 1600mg QHS.\par \par === 8/31/22 ===\par Attempted to switch LEV for CLB, but has had clusters of up to 5-6 nocturnal seizures daily as coming down on LEV. A few of the seizures were captured by home security camera, which pt shared. All seizures consist of brief (~20s) and stereotypical behavior of sudden arousal, loud vocalization, hands vigorously rubbing face and hair. CURRENT ASM: LEV ER 500mg QHS; ESL 1600mg QHS; CLB 15mg QHS – 6/2022.\par \par === 10/3/22 ===\par Since then, pt has had increasing daytime seizures with similar semiology, but curiously triggered by stimulation to the oral-buccal and teeth areas (eg. eating/drinking/flossing/brushing/rubbbing the temples and cheeks a certain way). Pt is extremely exhausted and anxious about seizures. Now amenable to exploring surgical options. CURRENT ASM: LEV ER 2500mg QHS; ESL 1600mg QHS; CLB 10mg QHS – 6/2022, inefficacious, fatigue, recurrence of daytime seizures, possibly brought on reflex seizures.\par \par \par SEIZURE DESCRIPTION AND TYPE:\par Type #1\par Severity: scalp-negative seizures\par Onset: 22yo\par Quality & associated signs/symptoms: Sudden arousal and sits up from bed, facial tingling/tightening, facial grimacing, whole-body tenses up, guttural vocalization, “sucking air through my clenched teeth,” +/- up-rising sensation in abd, +/- hypersalivation or spitting, bilateral hands vigorously rubbing the head or face, but some with left hand rubbing the head/face while the right hand is held next to. Immediately back to baseline postictally.\par Duration: 3-10s when occurred in daytime, 15-45s in sleep\par Timing: max 8-10 sz/night -> most recent 1-3 sz/night\par Modifying factors: while on CLB, triggered by eating/drinking/flossing/brushing teeth\par Circadian Variation: mostly in sleep, first sz usually 15m after falling asleep\par \par EPILEPSY TYPE: focal epilepsy\par HISTORY OF TONIC-CLONIC SZ: no\par HISTORY OF STATUS EPILEPTICUS: no  \par \par SEIZURE RISK FACTORS:\par Unknown FH or birth hx as he was adopted. No history of febrile seizure, TBI, CNS infection.\par \par PREVIOUS ASM:\par CZP - very drowsy\par LTG - worsened sz\par ZNS 100mg BID - inefficacious \par CBZ ER 800mg QHS - helpful, switched to ESL \par LCM – recurrence of daytime seizures\par PHT – inefficacious \par CNB 150mg QHS (also on ESL 1600mg QHS) – Jan to May of 2022, dizzy, very drowsy, not willing to decrease ESL \par CLB – June to Oct 2022, inefficacious, fatigue, daytime seizures, possibly brought on reflex seizures\par \par \par IMAGING: \par PET/MRI 9/13/17 ([facility]): no structural MRI or focal FDG correlate to symptoms. Few scattered supratentorial WM hyperintensities may correlate with age and/or cerebrovascular risk factors. Otherwise normal contrast MRI of the head.\par \par MRI w/o 3/4/2011 ([facility]): unremarkable \par \par NEUROPHYSIOLOGY:\par rEEG 1/7/22 ([facility]): independent, intermittent slowing in LT > RT\par \par vEEG 7/2017 ([facility]): This is a normal EEG capturing awake and asleep states. Four typical seizures were captured. They had no ictal EEG correlate, however the ictal semiology was stereotypical indicative of a surface negative EEG perhaps frontal lobe epilepsy.\par \par vEEG 5/23 – 5/24/2011 ([facility]): The semiology of the eight events was similar. He awoke from sleep looking confused and rubbed his face and head with both hands while making a guttural noise. In the two of the eight events, his right hand was immobile with a semi-dystonic posturing while rubbing his face with his left hand. The events lasted 20 to 30 seconds each. Following one of the events, nursing was present and he was noted to speak appropriately immediately after the seizure. The EEG for the eight events was also stereotyped with an onset marked by a diffuse slow wave followed by 1 to 2 seconds of attenuation before EMG artifact obscured the recording.

## 2023-04-25 ENCOUNTER — APPOINTMENT (OUTPATIENT)
Dept: FAMILY MEDICINE | Facility: CLINIC | Age: 58
End: 2023-04-25
Payer: COMMERCIAL

## 2023-04-25 VITALS
DIASTOLIC BLOOD PRESSURE: 94 MMHG | TEMPERATURE: 97.1 F | HEIGHT: 70 IN | SYSTOLIC BLOOD PRESSURE: 134 MMHG | RESPIRATION RATE: 16 BRPM | OXYGEN SATURATION: 98 % | HEART RATE: 61 BPM | WEIGHT: 215 LBS | BODY MASS INDEX: 30.78 KG/M2

## 2023-04-25 DIAGNOSIS — L40.9 PSORIASIS, UNSPECIFIED: ICD-10-CM

## 2023-04-25 PROCEDURE — 99214 OFFICE O/P EST MOD 30 MIN: CPT

## 2023-04-25 RX ORDER — NORMAL SALT TABLETS 1 G/G
1 TABLET ORAL TWICE DAILY
Qty: 14 | Refills: 0 | Status: DISCONTINUED | COMMUNITY
Start: 2022-10-24 | End: 2023-04-25

## 2023-04-25 RX ORDER — CLOBETASOL PROPIONATE 0.5 MG/G
0.05 AEROSOL, FOAM TOPICAL TWICE DAILY
Qty: 1 | Refills: 3 | Status: ACTIVE | COMMUNITY
Start: 2020-09-15 | End: 1900-01-01

## 2023-04-25 RX ORDER — CLONAZEPAM 0.5 MG/1
0.5 TABLET ORAL
Qty: 10 | Refills: 0 | Status: DISCONTINUED | COMMUNITY
Start: 2022-10-03 | End: 2023-04-25

## 2023-04-25 RX ORDER — ICOSAPENT ETHYL 1 G/1
1 CAPSULE ORAL TWICE DAILY
Qty: 360 | Refills: 3 | Status: COMPLETED | COMMUNITY
Start: 2020-08-27 | End: 2023-04-25

## 2023-04-30 ENCOUNTER — RX RENEWAL (OUTPATIENT)
Age: 58
End: 2023-04-30

## 2023-05-01 PROBLEM — L40.9 PSORIASIS: Status: ACTIVE | Noted: 2020-08-25

## 2023-05-01 NOTE — ASSESSMENT
[FreeTextEntry1] : 57 year old WM with considerable PMH of epilepsy follows with neurologist local \par ( was going into [place]) ;  Encouraged to continue! \par \par PMH of  HTN/HLD /Psoriasis CAD s/p stent 2015. \par \par Fasting labs .\par \par BP at goal  continue valsartan and labetalol   Referral provided fro Cardiology \par \par HLD  continue statin and  Vascepa \par \par Psoriasis  topical \par \par \par \par

## 2023-05-01 NOTE — HISTORY OF PRESENT ILLNESS
[FreeTextEntry1] : FUV requesting medications refills.\par Last seen for same in October 2022 and encounter reviewed.      \par \par Since denies any ER  visits or new MSK injuries.  \par Continues regular FU with Neurology ; with regard tp seizures, "about the same"\par NOt traveling as much..\par Continues to work out regularly.  [de-identified] : In review: October 2022\par   F/U - not fasting \par labetalol, valsartan, tamsulosin refills needed  \par \par Since has been following with Neuro, Dr. SWAIN with regard to seizures and medication adjustments. \par Multiple changes and new meds. to attempt amelioration /control of  seizure activity as noted on home /phone grace \par \par NEURO notes reviewed and appreciated. \par Currently  ROHINI is taking  Aptiom/ Depakote/clonazepam/ Keppra. \par "Doing OK"  Less seizures  however "tired all the time". \par Requesting new cardiologist ; "just don't care for Dabhi"\par \par Last seen in Dec. 2021 and encounter reviewed. \par \par in review: Dec. 2021 \par Reason for Visit:. Post-hospitalization from [facility]. \par Date(s) of hospitalization: The patient was admitted on 12/10/21. \par Brief Hospital Course: As per patient: \par Was out of Valsartan for about 5-7 days: "Pharmacy screwed up". \par \par Was experiencing severe headaches which were always the same; behind both ears and radiating posteriorly surrounding scalp for about 10 days. \par Was traveling to [place] for work and "just feeling awful" \par Patient also seen at [facility] ER in [place] on 12/8/21.\par \par Treated with IV labetalol and Tylenol for "several rounds" and BP normalized and he was discharged on Valsartan ( at usual dose ). NO IMAGING or LABS done. \par \par Returned on December 10 and was sent to [facility] by RN at cardiologist office as BP on admission to office was 230/120\par \par ER discharge summary for [facility] reviewed : \par Treated with Labetalol x 2 and oxycodone for H/A and also CT of Head. Unremarkable. \par \par Cardiology encounter yesterday and reviewed and noted for STOP METOPROLOL; Labetalol 200 mg BID \par Cyclobenzaprine for "neck " which he opined is causing H/A. \par \par ALso advised Neurology Consult and MRI EEG as per MD in [place]; \par He has appt. with Dr. SWAIN in [place] in early January.\par \par Neurology ON from Dr. Lester, [facility], of March 18, 2020 for daily nightly seizures usually during sleep for many years reviewed (after my request for ON).

## 2023-05-01 NOTE — HEALTH RISK ASSESSMENT
[Yes] : Yes [2 - 3 times a week (3 pts)] : 2 - 3  times a week (3 points) [3 or 4 (1 pt)] : 3 or 4  (1 point) [Never (0 pts)] : Never (0 points) [No] : In the past 12 months have you used drugs other than those required for medical reasons? No [No falls in past year] : Patient reported no falls in the past year [0] : 1) Little interest or pleasure doing things: Not at all (0) [PHQ-2 Negative - No further assessment needed] : PHQ-2 Negative - No further assessment needed [Audit-CScore] : 4 [[id number]] : 0

## 2023-05-01 NOTE — PHYSICAL EXAM
[No Acute Distress] : no acute distress [Normal Sclera/Conjunctiva] : normal sclera/conjunctiva [PERRL] : pupils equal round and reactive to light [No JVD] : no jugular venous distention [No Accessory Muscle Use] : no accessory muscle use [No Respiratory Distress] : no respiratory distress  [Clear to Auscultation] : lungs were clear to auscultation bilaterally [Regular Rhythm] : with a regular rhythm [Normal S1, S2] : normal S1 and S2 [No Joint Swelling] : no joint swelling [Grossly Normal Strength/Tone] : grossly normal strength/tone [Coordination Grossly Intact] : coordination grossly intact [No Focal Deficits] : no focal deficits [Deep Tendon Reflexes (DTR)] : deep tendon reflexes were 2+ and symmetric [Normal Insight/Judgement] : insight and judgment were intact [Alert and Oriented x3] : oriented to person, place, and time [de-identified] : a bit weary appearing  engaging  [de-identified] : ADITI

## 2023-06-15 ENCOUNTER — APPOINTMENT (OUTPATIENT)
Dept: NEUROLOGY | Facility: CLINIC | Age: 58
End: 2023-06-15
Payer: COMMERCIAL

## 2023-06-15 DIAGNOSIS — G40.919 EPILEPSY, UNSPECIFIED, INTRACTABLE, W/OUT STATUS EPILEPTICUS: ICD-10-CM

## 2023-06-15 PROCEDURE — 99214 OFFICE O/P EST MOD 30 MIN: CPT | Mod: 95

## 2023-06-15 RX ORDER — LEVETIRACETAM 500 MG/1
500 TABLET, FILM COATED, EXTENDED RELEASE ORAL
Qty: 450 | Refills: 3 | Status: ACTIVE | COMMUNITY
Start: 2020-09-29 | End: 1900-01-01

## 2023-06-15 NOTE — REASON FOR VISIT
[Follow-Up: _____] : a [unfilled] follow-up visit [Home] : at home, [unfilled] , at the time of the visit. [Other Location: e.g. Home (Enter Location, City,State)___] : at [unfilled] [Patient] : the patient [Self] : self [This encounter was initiated by telehealth (audio with video) and converted to telephone (audio only) due to technical difficulties.] : This encounter was initiated by telehealth (audio with video) and converted to telephone (audio only) due to technical difficulties.

## 2023-06-15 NOTE — ASSESSMENT
[FreeTextEntry1] : ANTONIO LOUIS is a 56 year-old RH male with a history of CAD s/p stent, HTN, HLD and headache who presents for follow-up for drug-resistant focal epilepsy. MRI/PET unremarkable. Normal EEG with scalp-negative seizures.\par \par tolerating divalproex with improved seizure control at low dose.. Due to nonlesional, surface-negative seizures and active career as a high-functioning , pt was previously not interested in presurgical evaluation, but is now willing to consider if he fails divalproex. His case was discussed at [facility], and the recommendation is to obtain iSPECT prior to iEEG. Will need to take precaution to prevent self-injury during iEEG given ictal behavior of vigorous rubbing of scalp. Dietary therapy is another option, but would be difficult to commit to. All questions and concerns answered and addressed in detail to patient's complete satisfaction. Patient verbalized understanding and agreed to plan.\par \par - continue divalproex 250mg QHS\par - continue levetiracetam ER 2500mg QHS\par - continue eslicarbazepine 1600mg QHS\par - f/u TEB visit in 6 mo, consider levels next visit.\par \par I have spent 30 minutes or longer reviewing patient data or discussing with the patient  the cause of seizures or seizure-like events and comorbid conditions, assessing the risk of recurrence, educating the patient or family to recognize seizures, discussing possible treatment options for seizures and comorbid conditions and documenting encounter and plan. More than 50% of time spent counseling and educating patient about epilepsy including safety issues, AED side effects and interactions, alcohol consumption, sleep deprivation, risks and driving privileges associated with the [facility]. Greater than 50% of the encounter time was spent on counseling and coordination of care for reviewing records in [employer], discussion with patient regarding plan.

## 2023-06-15 NOTE — HISTORY OF PRESENT ILLNESS
[FreeTextEntry1] : PCP: Dr. Weber\par \par *** 06/15/2023  ***\par Currently taking eslicarbazepine 1600 mg at bedtime, levetiracetam ER 2500 mg at bedtime, and divalproex to 50 mg at bedtime.  He reports substantial improvement in his seizure frequency.  He is now experiencing 1 seizure every 1–3 nights.  At baseline, he had many seizures each night, often 3-5 or more.  By Mr. LOUIS report, his seizures typically occur during REM sleep, leading to chronic sleep deprivation.   Mr. LOUIS has not experienced daytime seizures since he was 18 years old.  During the time he is taking seizure medications, seizures have only been out of sleep.  He was initially misdiagnosed as paroxysmal nocturnal dyskinesia/dystonia.  By report, ictal EEG is negative, but video is stereotypic for from lobe epilepsy.\par \par *** 10/11/2022 ***\par Tapered off CLB. Back on old regimen of LEV ER and ESL. Seizures have reverted back to nocturnal only, same frequency as before (1-3/night). His case was discussed at [facility], and the recommendation is to obtain iSPECT to help further lateralize/localize sz focus. However, pt would like to try VPA before moving forward with pre-surg eval. \par \par CURRENT ASM:\par LEV ER 2500mg QHS\par ESL 1600mg QHS\par \par \par PRIOR HISTORY:\par 1/10/22: This is a 56 year-old RH male with a history of CAD s/p stent, HTN, HLD and headache who is referred by Tena Schulz NP for evaluation and management of drug-resistant focal epilepsy. Patient was previously followed by multiple epileptologists/neurologists, most recently by Dr. Lester at [facility].\par \par Epilepsy onset at age 21. Seizures were first noted during daytime, where pt would have facial tingling sensation, distorted facial movements, deep inhalation (sucking air into lungs) and hypersalivation. Remained awareness and able to respond during the seizures. Each seizure lasted 3-10s and occurred up to multiple times daily.\par \par In a few years, daytime seizures decreased in frequency, but nocturnal seizures started to emerge, and eventually transitioned to seize exclusively in sleep. Seizures at night are described as sudden arousal, sits up fully aware, whole-body tenses up, “sucking air through my clenched teeth”, excessive salivation, pressing his temples using his hands thinking this would suppress intensity of the seizure, guttural sound, heart rate increases. Multiple seizures have been recorded in the past in [facility] and [facility] without ictal pattern on scalp EEG. Of the 8 seizures [facility] captured, 2 seizures provided some lateralizing signs with immobile right hand in a semi-dystonic posturing while the left hand rubbed the face.\par \par Tried different ASMs, never achieved seizure freedom, up to 8-10 seizures per night. ESL has been most efficacious. Last managed by Dr. Lester, who discussed surgical options with pt, but pt was hesitant to pursue due to nonlesional imaging and scalp-negative EEG. High functioning . CURRENT ASM: LEV ER 3000mg QHS; ESL 1600mg QHS.\par \par === 3/15/22 ===\par Started on CNB at the last visit. Seeing some efficacy. However, getting increasingly drowsy in the daytime and would like to come off of one of the ASM. Self-decreased LEV ER from 3000mg QHS to 2500mg QHS Continues to seize 1-3 every night. CURRENT ASM: LEV ER 2500mg QHS; ESL 1600mg QHS; Second week of CNB 100mg QHS – started 1/2022.\par \par === 6/14/22 ===\par Self-discontinued CNB in May due to dizziness and extreme fatigue. Explained to pt this may be combined effect of sodium channel blocker, as he was also on ESL 1600mg QHS, but pt does not want to go back on CNB. Pt was instructed to reduce LEV ER 2500mg QHS when CNB was started, but he noted increased sz frequency when LEV was reduced to 1500mg QHS, so he increased it back o 2500mg QHS. Continues to have about 1 sz every night and more when under stress. CURRENT ASM: LEV ER 2500mg QHS; ESL 1600mg QHS.\par \par === 8/31/22 ===\par Attempted to switch LEV for CLB, but has had clusters of up to 5-6 nocturnal seizures daily as coming down on LEV. A few of the seizures were captured by home security camera, which pt shared. All seizures consist of brief (~20s) and stereotypical behavior of sudden arousal, loud vocalization, hands vigorously rubbing face and hair. CURRENT ASM: LEV ER 500mg QHS; ESL 1600mg QHS; CLB 15mg QHS – 6/2022.\par \par === 10/3/22 ===\par Since then, pt has had increasing daytime seizures with similar semiology, but curiously triggered by stimulation to the oral-buccal and teeth areas (eg. eating/drinking/flossing/brushing/rubbbing the temples and cheeks a certain way). Pt is extremely exhausted and anxious about seizures. Now amenable to exploring surgical options. CURRENT ASM: LEV ER 2500mg QHS; ESL 1600mg QHS; CLB 10mg QHS – 6/2022, inefficacious, fatigue, recurrence of daytime seizures, possibly brought on reflex seizures.\par \par \par SEIZURE DESCRIPTION AND TYPE:\par Type #1\par Severity: scalp-negative seizures\par Onset: 20yo\par Quality & associated signs/symptoms: Sudden arousal and sits up from bed, facial tingling/tightening, facial grimacing, whole-body tenses up, guttural vocalization, “sucking air through my clenched teeth,” +/- up-rising sensation in abd, +/- hypersalivation or spitting, bilateral hands vigorously rubbing the head or face, but some with left hand rubbing the head/face while the right hand is held next to. Immediately back to baseline postictally.\par Duration: 3-10s when occurred in daytime, 15-45s in sleep\par Timing: max 8-10 sz/night -> most recent 1-3 sz/night\par Modifying factors: while on CLB, triggered by eating/drinking/flossing/brushing teeth\par Circadian Variation: mostly in sleep, first sz usually 15m after falling asleep\par \par EPILEPSY TYPE: focal epilepsy\par HISTORY OF TONIC-CLONIC SZ: no\par HISTORY OF STATUS EPILEPTICUS: no \par \par SEIZURE RISK FACTORS:\par Unknown FH or birth hx as he was adopted. No history of febrile seizure, TBI, CNS infection.\par \par PREVIOUS ASM:\par CZP - very drowsy\par LTG - worsened sz\par ZNS 100mg BID - inefficacious \par CBZ ER 800mg QHS - helpful, switched to ESL \par LCM – recurrence of daytime seizures\par PHT – inefficacious \par CNB 150mg QHS (also on ESL 1600mg QHS) – Jan to May of 2022, dizzy, very drowsy, not willing to decrease ESL \par CLB – June to Oct 2022, inefficacious, fatigue, daytime seizures, possibly brought on reflex seizures\par \par \par IMAGING: \par PET/MRI 9/13/17 ([facility]): no structural MRI or focal FDG correlate to symptoms. Few scattered supratentorial WM hyperintensities may correlate with age and/or cerebrovascular risk factors. Otherwise normal contrast MRI of the head.\par \par MRI w/o 3/4/2011 ([facility]): unremarkable \par \par NEUROPHYSIOLOGY:\par rEEG 1/7/22 ([facility]): independent, intermittent slowing in LT > RT\par \par vEEG 7/2017 ([facility]): This is a normal EEG capturing awake and asleep states. Four typical seizures were captured. They had no ictal EEG correlate, however the ictal semiology was stereotypical indicative of a surface negative EEG perhaps frontal lobe epilepsy.\par \par vEEG 5/23 – 5/24/2011 ([facility]): The semiology of the eight events was similar. He awoke from sleep looking confused and rubbed his face and head with both hands while making a guttural noise. In the two of the eight events, his right hand was immobile with a semi-dystonic posturing while rubbing his face with his left hand. The events lasted 20 to 30 seconds each. Following one of the events, nursing was present and he was noted to speak appropriately immediately after the seizure. The EEG for the eight events was also stereotyped with an onset marked by a diffuse slow wave followed by 1 to 2 seconds of attenuation before EMG artifact obscured the recording. \par

## 2023-07-19 ENCOUNTER — RX RENEWAL (OUTPATIENT)
Age: 58
End: 2023-07-19

## 2023-08-07 ENCOUNTER — RX RENEWAL (OUTPATIENT)
Age: 58
End: 2023-08-07

## 2023-08-08 ENCOUNTER — RX RENEWAL (OUTPATIENT)
Age: 58
End: 2023-08-08

## 2023-08-24 RX ORDER — VALSARTAN AND HYDROCHLOROTHIAZIDE 80; 12.5 MG/1; MG/1
80-12.5 TABLET, FILM COATED ORAL
Qty: 90 | Refills: 1 | Status: ACTIVE | COMMUNITY
Start: 2020-08-21 | End: 1900-01-01

## 2023-09-28 RX ORDER — LABETALOL HYDROCHLORIDE 200 MG/1
200 TABLET, FILM COATED ORAL
Qty: 180 | Refills: 1 | Status: ACTIVE | COMMUNITY
Start: 2021-12-13 | End: 1900-01-01

## 2023-11-28 ENCOUNTER — APPOINTMENT (OUTPATIENT)
Dept: NEUROLOGY | Facility: CLINIC | Age: 58
End: 2023-11-28
Payer: COMMERCIAL

## 2023-11-28 DIAGNOSIS — G40.019 LOCALIZATION-RELATED (FOCAL) (PARTIAL) IDIOPATHIC EPILEPSY AND EPILEPTIC SYNDROMES WITH SEIZURES OF LOCALIZED ONSET, INTRACTABLE, W/OUT STATUS EPILEPTICUS: ICD-10-CM

## 2023-11-28 PROCEDURE — 99214 OFFICE O/P EST MOD 30 MIN: CPT | Mod: 95

## 2023-11-28 RX ORDER — ESLICARBAZEPINE ACETATE 800 MG/1
800 TABLET ORAL
Qty: 180 | Refills: 1 | Status: ACTIVE | COMMUNITY
Start: 2023-07-19 | End: 1900-01-01

## 2023-12-12 RX ORDER — ATORVASTATIN CALCIUM 80 MG/1
80 TABLET, FILM COATED ORAL DAILY
Qty: 90 | Refills: 0 | Status: ACTIVE | COMMUNITY
Start: 1900-01-01 | End: 1900-01-01

## 2023-12-15 ENCOUNTER — APPOINTMENT (OUTPATIENT)
Dept: FAMILY MEDICINE | Facility: CLINIC | Age: 58
End: 2023-12-15
Payer: COMMERCIAL

## 2023-12-15 VITALS
RESPIRATION RATE: 16 BRPM | DIASTOLIC BLOOD PRESSURE: 80 MMHG | WEIGHT: 220 LBS | OXYGEN SATURATION: 96 % | HEART RATE: 76 BPM | TEMPERATURE: 98.3 F | HEIGHT: 70 IN | BODY MASS INDEX: 31.5 KG/M2 | SYSTOLIC BLOOD PRESSURE: 112 MMHG

## 2023-12-15 DIAGNOSIS — R73.03 PREDIABETES.: ICD-10-CM

## 2023-12-15 DIAGNOSIS — I10 ESSENTIAL (PRIMARY) HYPERTENSION: ICD-10-CM

## 2023-12-15 DIAGNOSIS — Z23 ENCOUNTER FOR IMMUNIZATION: ICD-10-CM

## 2023-12-15 DIAGNOSIS — E78.5 HYPERLIPIDEMIA, UNSPECIFIED: ICD-10-CM

## 2023-12-15 PROCEDURE — G0008: CPT

## 2023-12-15 PROCEDURE — 90686 IIV4 VACC NO PRSV 0.5 ML IM: CPT

## 2023-12-15 PROCEDURE — 99214 OFFICE O/P EST MOD 30 MIN: CPT | Mod: 25

## 2023-12-15 RX ORDER — TAMSULOSIN HYDROCHLORIDE 0.4 MG/1
0.4 CAPSULE ORAL
Qty: 90 | Refills: 1 | Status: ACTIVE | COMMUNITY
Start: 2020-10-20 | End: 1900-01-01

## 2023-12-20 ENCOUNTER — TRANSCRIPTION ENCOUNTER (OUTPATIENT)
Age: 58
End: 2023-12-20

## 2024-01-22 ENCOUNTER — APPOINTMENT (OUTPATIENT)
Dept: UROLOGY | Facility: CLINIC | Age: 59
End: 2024-01-22
Payer: COMMERCIAL

## 2024-01-22 ENCOUNTER — NON-APPOINTMENT (OUTPATIENT)
Age: 59
End: 2024-01-22

## 2024-01-22 VITALS
DIASTOLIC BLOOD PRESSURE: 87 MMHG | SYSTOLIC BLOOD PRESSURE: 150 MMHG | HEART RATE: 64 BPM | BODY MASS INDEX: 31.5 KG/M2 | TEMPERATURE: 97.3 F | WEIGHT: 220 LBS | HEIGHT: 70 IN

## 2024-01-22 DIAGNOSIS — N40.0 BENIGN PROSTATIC HYPERPLASIA WITHOUT LOWER URINARY TRACT SYMPMS: ICD-10-CM

## 2024-01-22 PROCEDURE — 99204 OFFICE O/P NEW MOD 45 MIN: CPT

## 2024-01-22 NOTE — HISTORY OF PRESENT ILLNESS
[FreeTextEntry1] : 57 yo male BPH, HTN, nocturnal seizures, CAD, HL, urinary frequency, also nocturia 8x per night. recently started tamsulosin 0.4 QHS and says it helped significantly. IPSS=18/JULIANN=15 still has urinary straining Office PVR=17mL

## 2024-01-22 NOTE — PHYSICAL EXAM
[General Appearance - Well Developed] : well developed [] : no respiratory distress [Abdomen Soft] : soft [Urinary Bladder Findings] : the bladder was normal on palpation [Normal Station and Gait] : the gait and station were normal for the patient's age [Oriented To Time, Place, And Person] : oriented to person, place, and time [Not Anxious] : not anxious

## 2024-01-23 ENCOUNTER — APPOINTMENT (OUTPATIENT)
Dept: MRI IMAGING | Facility: CLINIC | Age: 59
End: 2024-01-23
Payer: COMMERCIAL

## 2024-01-23 PROCEDURE — 73721 MRI JNT OF LWR EXTRE W/O DYE: CPT | Mod: LT

## 2024-01-30 NOTE — HISTORY OF PRESENT ILLNESS
[FreeTextEntry1] : FBW Asking what MD he has to see.... regularly for medication refills.  FUV requesting medications refills. Appreciates benefit of tamsulosin with regard to BPH. Last seen for same in October 2022 and encounter reviewed.       Since denies any ER  visits or new MSK injuries.   Continues regular FU with Neurology ; with regard tp seizures, "about the same" Not traveling as much.. Continues to work out regularly.  [de-identified] : In review: October 2022\par    F/U - not fasting \par  labetalol, valsartan, tamsulosin refills needed  \par  \par  Since has been following with Neuro, Dr. SWAIN with regard to seizures and medication adjustments. \par  Multiple changes and new meds. to attempt amelioration /control of  seizure activity as noted on home /phone grace \par  \par  NEURO notes reviewed and appreciated. \par  Currently  ROHINI is taking  Aptiom/ Depakote/clonazepam/ Keppra. \par  "Doing OK"  Less seizures  however "tired all the time". \par  Requesting new cardiologist ; "just don't care for Dabhi"\par  \par  Last seen in Dec. 2021 and encounter reviewed. \par  \par  in review: Dec. 2021 \par  Reason for Visit:. Post-hospitalization from [facility]. \par  Date(s) of hospitalization: The patient was admitted on 12/10/21. \par  Brief Hospital Course: As per patient: \par  Was out of Valsartan for about 5-7 days: "Pharmacy screwed up". \par  \par  Was experiencing severe headaches which were always the same; behind both ears and radiating posteriorly surrounding scalp for about 10 days. \par  Was traveling to [place] for work and "just feeling awful" \par  Patient also seen at [facility] in [place] on 12/8/21.\par  \par  Treated with IV labetalol and Tylenol for "several rounds" and BP normalized and he was discharged on Valsartan ( at usual dose ). NO IMAGING or LABS done. \par  \par  Returned on December 10 and was sent to [facility] by RN at cardiologist office as BP on admission to office was 230/120\par  \par  ER discharge summary for [facility] reviewed : \par  Treated with Labetalol x 2 and oxycodone for H/A and also CT of Head. Unremarkable. \par  \par  Cardiology encounter yesterday and reviewed and noted for STOP METOPROLOL; Labetalol 200 mg BID \par  Cyclobenzaprine for "neck " which he opined is causing H/A. \par  \par  ALso advised Neurology Consult and MRI EEG as per MD in [place]; \par  He has appt. with Dr. SWAIN in [place] in early January.\par  \par  Neurology ON from Dr. Lester, [facility], of March 18, 2020 for daily nightly seizures usually during sleep for many years reviewed (after my request for ON).

## 2024-01-30 NOTE — HEALTH RISK ASSESSMENT
[Yes] : Yes [2 - 3 times a week (3 pts)] : 2 - 3  times a week (3 points) [3 or 4 (1 pt)] : 3 or 4  (1 point) [Never (0 pts)] : Never (0 points) [No] : In the past 12 months have you used drugs other than those required for medical reasons? No [No falls in past year] : Patient reported no falls in the past year [0] : 2) Feeling down, depressed, or hopeless: Not at all (0) [PHQ-2 Negative - No further assessment needed] : PHQ-2 Negative - No further assessment needed [Audit-CScore] : 4 [[id number]] : 0 [Never] : Never

## 2024-01-30 NOTE — PHYSICAL EXAM
[No Acute Distress] : no acute distress [Normal Sclera/Conjunctiva] : normal sclera/conjunctiva [PERRL] : pupils equal round and reactive to light [No JVD] : no jugular venous distention [No Respiratory Distress] : no respiratory distress  [No Accessory Muscle Use] : no accessory muscle use [Clear to Auscultation] : lungs were clear to auscultation bilaterally [Regular Rhythm] : with a regular rhythm [Normal S1, S2] : normal S1 and S2 [No Joint Swelling] : no joint swelling [Grossly Normal Strength/Tone] : grossly normal strength/tone [Coordination Grossly Intact] : coordination grossly intact [No Focal Deficits] : no focal deficits [Deep Tendon Reflexes (DTR)] : deep tendon reflexes were 2+ and symmetric [Alert and Oriented x3] : oriented to person, place, and time [Normal Insight/Judgement] : insight and judgment were intact [No Edema] : there was no peripheral edema [No Spinal Tenderness] : no spinal tenderness [de-identified] : a bit weary appearing  engaging  [de-identified] : ADITI

## 2024-01-30 NOTE — ASSESSMENT
[FreeTextEntry1] : The following plan was discussed with patient:  #1 Close Fu with Neurology .  #2 UROLOGY FU as planned.  #3  HLD/Pediabetes.obesity Labs today. +/- Glp-1>weight  Advised to attempt weight loss through improved  eating habits by avoiding fast foods/processed foods/ junk food . Increase vegetable and salads.  Encouraged to consider  joining [employer] if BMI > 25.  FU in 2 weeks to review pan after lab review.  Best wishes offered!

## 2024-02-09 ENCOUNTER — LABORATORY RESULT (OUTPATIENT)
Age: 59
End: 2024-02-09

## 2024-02-09 ENCOUNTER — APPOINTMENT (OUTPATIENT)
Dept: ULTRASOUND IMAGING | Facility: CLINIC | Age: 59
End: 2024-02-09
Payer: COMMERCIAL

## 2024-02-09 PROCEDURE — 76857 US EXAM PELVIC LIMITED: CPT

## 2024-03-06 ENCOUNTER — APPOINTMENT (OUTPATIENT)
Dept: UROLOGY | Facility: CLINIC | Age: 59
End: 2024-03-06

## 2024-03-21 ENCOUNTER — NON-APPOINTMENT (OUTPATIENT)
Age: 59
End: 2024-03-21

## 2024-05-02 ENCOUNTER — RX RENEWAL (OUTPATIENT)
Age: 59
End: 2024-05-02

## 2024-05-02 RX ORDER — ATORVASTATIN CALCIUM 40 MG/1
40 TABLET, FILM COATED ORAL
Qty: 90 | Refills: 1 | Status: ACTIVE | COMMUNITY
Start: 2024-05-02 | End: 1900-01-01

## 2024-06-06 LAB
ALBUMIN SERPL ELPH-MCNC: 5.1 G/DL
ALP BLD-CCNC: 75 U/L
ALT SERPL-CCNC: 52 U/L
ANION GAP SERPL CALC-SCNC: 13 MMOL/L
APPEARANCE: CLEAR
AST SERPL-CCNC: 29 U/L
BASOPHILS # BLD AUTO: 0.04 K/UL
BASOPHILS NFR BLD AUTO: 0.9 %
BILIRUB SERPL-MCNC: 0.6 MG/DL
BILIRUBIN URINE: NEGATIVE
BLOOD URINE: NEGATIVE
BUN SERPL-MCNC: 13 MG/DL
CALCIUM SERPL-MCNC: 10.2 MG/DL
CHLORIDE SERPL-SCNC: 99 MMOL/L
CHOLEST SERPL-MCNC: 236 MG/DL
CO2 SERPL-SCNC: 25 MMOL/L
COLOR: YELLOW
CREAT SERPL-MCNC: 0.89 MG/DL
EGFR: 99 ML/MIN/1.73M2
EOSINOPHIL # BLD AUTO: 0.11 K/UL
EOSINOPHIL NFR BLD AUTO: 2.3 %
ESTIMATED AVERAGE GLUCOSE: 126 MG/DL
GLUCOSE QUALITATIVE U: NEGATIVE MG/DL
GLUCOSE SERPL-MCNC: 124 MG/DL
HBA1C MFR BLD HPLC: 6 %
HCT VFR BLD CALC: 45.5 %
HDLC SERPL-MCNC: 68 MG/DL
HGB BLD-MCNC: 15.6 G/DL
IMM GRANULOCYTES NFR BLD AUTO: 0.2 %
KETONES URINE: NEGATIVE MG/DL
LDLC SERPL CALC-MCNC: 117 MG/DL
LEUKOCYTE ESTERASE URINE: NEGATIVE
LYMPHOCYTES # BLD AUTO: 1.34 K/UL
LYMPHOCYTES NFR BLD AUTO: 28.6 %
MAN DIFF?: NORMAL
MCHC RBC-ENTMCNC: 30.9 PG
MCHC RBC-ENTMCNC: 34.3 GM/DL
MCV RBC AUTO: 90.1 FL
MONOCYTES # BLD AUTO: 0.48 K/UL
MONOCYTES NFR BLD AUTO: 10.2 %
NEUTROPHILS # BLD AUTO: 2.71 K/UL
NEUTROPHILS NFR BLD AUTO: 57.8 %
NITRITE URINE: NEGATIVE
NONHDLC SERPL-MCNC: 169 MG/DL
PH URINE: 5.5
PLATELET # BLD AUTO: 203 K/UL
POTASSIUM SERPL-SCNC: 4.2 MMOL/L
PROT SERPL-MCNC: 7.3 G/DL
PROTEIN URINE: NEGATIVE MG/DL
RBC # BLD: 5.05 M/UL
RBC # FLD: 12.1 %
SODIUM SERPL-SCNC: 137 MMOL/L
SPECIFIC GRAVITY URINE: 1.03
TRIGL SERPL-MCNC: 300 MG/DL
TSH SERPL-ACNC: 1.74 UIU/ML
UROBILINOGEN URINE: 1 MG/DL
WBC # FLD AUTO: 4.69 K/UL

## 2024-06-13 ENCOUNTER — RX RENEWAL (OUTPATIENT)
Age: 59
End: 2024-06-13

## 2024-06-13 RX ORDER — DIVALPROEX SODIUM 250 MG/1
250 TABLET, DELAYED RELEASE ORAL
Qty: 90 | Refills: 3 | Status: ACTIVE | COMMUNITY
Start: 2022-10-11 | End: 1900-01-01

## 2024-07-16 ENCOUNTER — RX RENEWAL (OUTPATIENT)
Age: 59
End: 2024-07-16

## 2024-11-29 ENCOUNTER — RX RENEWAL (OUTPATIENT)
Age: 59
End: 2024-11-29

## 2025-04-21 ENCOUNTER — NON-APPOINTMENT (OUTPATIENT)
Age: 60
End: 2025-04-21

## 2025-04-23 ENCOUNTER — APPOINTMENT (OUTPATIENT)
Dept: NEUROLOGY | Facility: CLINIC | Age: 60
End: 2025-04-23
Payer: COMMERCIAL

## 2025-04-23 VITALS
HEART RATE: 77 BPM | OXYGEN SATURATION: 98 % | HEIGHT: 70 IN | WEIGHT: 218 LBS | SYSTOLIC BLOOD PRESSURE: 130 MMHG | DIASTOLIC BLOOD PRESSURE: 64 MMHG | BODY MASS INDEX: 31.21 KG/M2

## 2025-04-23 DIAGNOSIS — G40.019 LOCALIZATION-RELATED (FOCAL) (PARTIAL) IDIOPATHIC EPILEPSY AND EPILEPTIC SYNDROMES WITH SEIZURES OF LOCALIZED ONSET, INTRACTABLE, W/OUT STATUS EPILEPTICUS: ICD-10-CM

## 2025-04-23 PROCEDURE — 99215 OFFICE O/P EST HI 40 MIN: CPT

## 2025-04-23 RX ORDER — CENOBAMATE 12.5-25MG
14 X 12.5 MG & KIT ORAL
Qty: 28 | Refills: 0 | Status: ACTIVE | COMMUNITY
Start: 2025-04-23 | End: 1900-01-01

## 2025-04-23 RX ORDER — ICOSAPENT ETHYL 1000 MG/1
1 CAPSULE ORAL
Refills: 0 | Status: ACTIVE | COMMUNITY

## 2025-05-22 ENCOUNTER — APPOINTMENT (OUTPATIENT)
Dept: INTERNAL MEDICINE | Facility: CLINIC | Age: 60
End: 2025-05-22

## 2025-05-22 ENCOUNTER — OUTPATIENT (OUTPATIENT)
Dept: OUTPATIENT SERVICES | Facility: HOSPITAL | Age: 60
LOS: 1 days | End: 2025-05-22

## 2025-06-19 ENCOUNTER — APPOINTMENT (OUTPATIENT)
Dept: INTERNAL MEDICINE | Facility: CLINIC | Age: 60
End: 2025-06-19

## 2025-06-19 ENCOUNTER — OUTPATIENT (OUTPATIENT)
Dept: OUTPATIENT SERVICES | Facility: HOSPITAL | Age: 60
LOS: 1 days | End: 2025-06-19

## 2025-06-19 RX ORDER — CENOBAMATE 150-200 MG
14 X 150 MG & KIT ORAL
Qty: 28 | Refills: 0 | Status: ACTIVE | COMMUNITY
Start: 2025-06-19 | End: 1900-01-01

## 2025-07-03 ENCOUNTER — APPOINTMENT (OUTPATIENT)
Dept: INTERNAL MEDICINE | Facility: CLINIC | Age: 60
End: 2025-07-03

## 2025-07-03 ENCOUNTER — OUTPATIENT (OUTPATIENT)
Dept: OUTPATIENT SERVICES | Facility: HOSPITAL | Age: 60
LOS: 1 days | End: 2025-07-03

## 2025-07-10 ENCOUNTER — APPOINTMENT (OUTPATIENT)
Dept: INTERNAL MEDICINE | Facility: CLINIC | Age: 60
End: 2025-07-10

## 2025-07-10 ENCOUNTER — OUTPATIENT (OUTPATIENT)
Dept: OUTPATIENT SERVICES | Facility: HOSPITAL | Age: 60
LOS: 1 days | End: 2025-07-10

## 2025-07-10 RX ORDER — CENOBAMATE 100 MG/1
100 TABLET, FILM COATED ORAL
Qty: 30 | Refills: 3 | Status: COMPLETED | COMMUNITY
Start: 2025-06-30 | End: 2025-07-10

## 2025-07-10 RX ORDER — CENOBAMATE 150-200 MG
14 X 150 MG & KIT ORAL
Qty: 28 | Refills: 0 | Status: ACTIVE | COMMUNITY
Start: 2025-07-10

## 2025-07-28 RX ORDER — CENOBAMATE 200 MG/1
200 TABLET, FILM COATED ORAL DAILY
Qty: 30 | Refills: 5 | Status: ACTIVE | COMMUNITY
Start: 2025-07-21

## 2025-08-05 ENCOUNTER — OUTPATIENT (OUTPATIENT)
Dept: OUTPATIENT SERVICES | Facility: HOSPITAL | Age: 60
LOS: 1 days | End: 2025-08-05

## 2025-08-05 ENCOUNTER — APPOINTMENT (OUTPATIENT)
Dept: INTERNAL MEDICINE | Facility: CLINIC | Age: 60
End: 2025-08-05

## 2025-08-19 ENCOUNTER — OUTPATIENT (OUTPATIENT)
Dept: OUTPATIENT SERVICES | Facility: HOSPITAL | Age: 60
LOS: 1 days | End: 2025-08-19

## 2025-08-19 ENCOUNTER — APPOINTMENT (OUTPATIENT)
Dept: INTERNAL MEDICINE | Facility: CLINIC | Age: 60
End: 2025-08-19

## 2025-08-20 RX ORDER — CENOBAMATE 50 MG/1
50 TABLET, FILM COATED ORAL
Qty: 30 | Refills: 5 | Status: ACTIVE | COMMUNITY
Start: 2025-08-19 | End: 1900-01-01

## 2025-09-03 ENCOUNTER — APPOINTMENT (OUTPATIENT)
Dept: INTERNAL MEDICINE | Facility: CLINIC | Age: 60
End: 2025-09-03

## 2025-09-03 ENCOUNTER — OUTPATIENT (OUTPATIENT)
Dept: OUTPATIENT SERVICES | Facility: HOSPITAL | Age: 60
LOS: 1 days | End: 2025-09-03

## 2025-09-05 ENCOUNTER — TRANSCRIPTION ENCOUNTER (OUTPATIENT)
Age: 60
End: 2025-09-05